# Patient Record
Sex: MALE | Race: WHITE | NOT HISPANIC OR LATINO | URBAN - METROPOLITAN AREA
[De-identification: names, ages, dates, MRNs, and addresses within clinical notes are randomized per-mention and may not be internally consistent; named-entity substitution may affect disease eponyms.]

---

## 2020-12-24 ENCOUNTER — INPATIENT (INPATIENT)
Facility: HOSPITAL | Age: 75
LOS: 1 days | Discharge: HOME | DRG: 345 | End: 2020-12-26
Attending: SURGERY | Admitting: SURGERY
Payer: MEDICARE

## 2020-12-24 VITALS
HEART RATE: 52 BPM | TEMPERATURE: 95 F | OXYGEN SATURATION: 100 % | DIASTOLIC BLOOD PRESSURE: 61 MMHG | RESPIRATION RATE: 22 BRPM | SYSTOLIC BLOOD PRESSURE: 155 MMHG

## 2020-12-24 DIAGNOSIS — K45.8 OTHER SPECIFIED ABDOMINAL HERNIA WITHOUT OBSTRUCTION OR GANGRENE: ICD-10-CM

## 2020-12-24 LAB
ABO RH CONFIRMATION: SIGNIFICANT CHANGE UP
ALBUMIN SERPL ELPH-MCNC: 2.8 G/DL — LOW (ref 3.3–5)
ALP SERPL-CCNC: 144 U/L — HIGH (ref 40–120)
ALT FLD-CCNC: 13 U/L — SIGNIFICANT CHANGE UP (ref 12–78)
ANION GAP SERPL CALC-SCNC: 8 MMOL/L — SIGNIFICANT CHANGE UP (ref 5–17)
ANISOCYTOSIS BLD QL: SLIGHT — SIGNIFICANT CHANGE UP
APPEARANCE UR: CLEAR — SIGNIFICANT CHANGE UP
APTT BLD: 30.2 SEC — SIGNIFICANT CHANGE UP (ref 27.5–35.5)
AST SERPL-CCNC: 18 U/L — SIGNIFICANT CHANGE UP (ref 15–37)
BASOPHILS # BLD AUTO: 0 K/UL — SIGNIFICANT CHANGE UP (ref 0–0.2)
BASOPHILS NFR BLD AUTO: 0 % — SIGNIFICANT CHANGE UP (ref 0–2)
BILIRUB SERPL-MCNC: 0.3 MG/DL — SIGNIFICANT CHANGE UP (ref 0.2–1.2)
BILIRUB UR-MCNC: NEGATIVE — SIGNIFICANT CHANGE UP
BLD GP AB SCN SERPL QL: SIGNIFICANT CHANGE UP
BUN SERPL-MCNC: 31 MG/DL — HIGH (ref 7–23)
CALCIUM SERPL-MCNC: 8.8 MG/DL — SIGNIFICANT CHANGE UP (ref 8.5–10.1)
CHLORIDE SERPL-SCNC: 105 MMOL/L — SIGNIFICANT CHANGE UP (ref 96–108)
CO2 SERPL-SCNC: 24 MMOL/L — SIGNIFICANT CHANGE UP (ref 22–31)
COLOR SPEC: YELLOW — SIGNIFICANT CHANGE UP
CREAT SERPL-MCNC: 1.09 MG/DL — SIGNIFICANT CHANGE UP (ref 0.5–1.3)
DIFF PNL FLD: NEGATIVE — SIGNIFICANT CHANGE UP
EOSINOPHIL # BLD AUTO: 0 K/UL — SIGNIFICANT CHANGE UP (ref 0–0.5)
EOSINOPHIL NFR BLD AUTO: 0 % — SIGNIFICANT CHANGE UP (ref 0–6)
GLUCOSE SERPL-MCNC: 248 MG/DL — HIGH (ref 70–99)
GLUCOSE UR QL: 100 MG/DL
HCT VFR BLD CALC: 33.3 % — LOW (ref 39–50)
HGB BLD-MCNC: 10.5 G/DL — LOW (ref 13–17)
INR BLD: 0.94 RATIO — SIGNIFICANT CHANGE UP (ref 0.88–1.16)
KETONES UR-MCNC: ABNORMAL
LACTATE SERPL-SCNC: 2 MMOL/L — SIGNIFICANT CHANGE UP (ref 0.7–2)
LEUKOCYTE ESTERASE UR-ACNC: NEGATIVE — SIGNIFICANT CHANGE UP
LIDOCAIN IGE QN: 123 U/L — SIGNIFICANT CHANGE UP (ref 73–393)
LYMPHOCYTES # BLD AUTO: 0.49 K/UL — LOW (ref 1–3.3)
LYMPHOCYTES # BLD AUTO: 8 % — LOW (ref 13–44)
MACROCYTES BLD QL: SLIGHT — SIGNIFICANT CHANGE UP
MANUAL SMEAR VERIFICATION: SIGNIFICANT CHANGE UP
MCHC RBC-ENTMCNC: 30.5 PG — SIGNIFICANT CHANGE UP (ref 27–34)
MCHC RBC-ENTMCNC: 31.5 GM/DL — LOW (ref 32–36)
MCV RBC AUTO: 96.8 FL — SIGNIFICANT CHANGE UP (ref 80–100)
MONOCYTES # BLD AUTO: 0.24 K/UL — SIGNIFICANT CHANGE UP (ref 0–0.9)
MONOCYTES NFR BLD AUTO: 4 % — SIGNIFICANT CHANGE UP (ref 2–14)
NEUTROPHILS # BLD AUTO: 5.36 K/UL — SIGNIFICANT CHANGE UP (ref 1.8–7.4)
NEUTROPHILS NFR BLD AUTO: 87 % — HIGH (ref 43–77)
NEUTS BAND # BLD: 1 % — SIGNIFICANT CHANGE UP (ref 0–8)
NITRITE UR-MCNC: NEGATIVE — SIGNIFICANT CHANGE UP
NRBC # BLD: 0 /100 — SIGNIFICANT CHANGE UP (ref 0–0)
NRBC # BLD: SIGNIFICANT CHANGE UP /100 WBCS (ref 0–0)
NT-PROBNP SERPL-SCNC: 222 PG/ML — HIGH (ref 0–125)
OVALOCYTES BLD QL SMEAR: SLIGHT — SIGNIFICANT CHANGE UP
PH UR: 5 — SIGNIFICANT CHANGE UP (ref 5–8)
PLAT MORPH BLD: NORMAL — SIGNIFICANT CHANGE UP
PLATELET # BLD AUTO: 208 K/UL — SIGNIFICANT CHANGE UP (ref 150–400)
POIKILOCYTOSIS BLD QL AUTO: SLIGHT — SIGNIFICANT CHANGE UP
POLYCHROMASIA BLD QL SMEAR: SLIGHT — SIGNIFICANT CHANGE UP
POTASSIUM SERPL-MCNC: 3.6 MMOL/L — SIGNIFICANT CHANGE UP (ref 3.5–5.3)
POTASSIUM SERPL-SCNC: 3.6 MMOL/L — SIGNIFICANT CHANGE UP (ref 3.5–5.3)
PROT SERPL-MCNC: 6.3 GM/DL — SIGNIFICANT CHANGE UP (ref 6–8.3)
PROT UR-MCNC: NEGATIVE MG/DL — SIGNIFICANT CHANGE UP
PROTHROM AB SERPL-ACNC: 10.9 SEC — SIGNIFICANT CHANGE UP (ref 10.6–13.6)
RBC # BLD: 3.44 M/UL — LOW (ref 4.2–5.8)
RBC # FLD: 13.8 % — SIGNIFICANT CHANGE UP (ref 10.3–14.5)
RBC BLD AUTO: ABNORMAL
SARS-COV-2 IGG SERPL QL IA: NEGATIVE — SIGNIFICANT CHANGE UP
SARS-COV-2 IGM SERPL IA-ACNC: 0.06 INDEX — SIGNIFICANT CHANGE UP
SARS-COV-2 RNA SPEC QL NAA+PROBE: SIGNIFICANT CHANGE UP
SODIUM SERPL-SCNC: 137 MMOL/L — SIGNIFICANT CHANGE UP (ref 135–145)
SP GR SPEC: 1.02 — SIGNIFICANT CHANGE UP (ref 1.01–1.02)
TROPONIN I SERPL-MCNC: <0.015 NG/ML — SIGNIFICANT CHANGE UP (ref 0.01–0.04)
UROBILINOGEN FLD QL: NEGATIVE MG/DL — SIGNIFICANT CHANGE UP
WBC # BLD: 6.09 K/UL — SIGNIFICANT CHANGE UP (ref 3.8–10.5)
WBC # FLD AUTO: 6.09 K/UL — SIGNIFICANT CHANGE UP (ref 3.8–10.5)

## 2020-12-24 PROCEDURE — 97116 GAIT TRAINING THERAPY: CPT | Mod: GP

## 2020-12-24 PROCEDURE — 85027 COMPLETE CBC AUTOMATED: CPT

## 2020-12-24 PROCEDURE — 71045 X-RAY EXAM CHEST 1 VIEW: CPT | Mod: 26,76

## 2020-12-24 PROCEDURE — 80048 BASIC METABOLIC PNL TOTAL CA: CPT

## 2020-12-24 PROCEDURE — 36415 COLL VENOUS BLD VENIPUNCTURE: CPT

## 2020-12-24 PROCEDURE — 97530 THERAPEUTIC ACTIVITIES: CPT | Mod: GP

## 2020-12-24 PROCEDURE — 74176 CT ABD & PELVIS W/O CONTRAST: CPT | Mod: 26,59

## 2020-12-24 PROCEDURE — 93010 ELECTROCARDIOGRAM REPORT: CPT

## 2020-12-24 PROCEDURE — 83735 ASSAY OF MAGNESIUM: CPT

## 2020-12-24 PROCEDURE — 84100 ASSAY OF PHOSPHORUS: CPT

## 2020-12-24 PROCEDURE — 74174 CTA ABD&PLVS W/CONTRAST: CPT | Mod: 26

## 2020-12-24 PROCEDURE — 71045 X-RAY EXAM CHEST 1 VIEW: CPT | Mod: 26,77

## 2020-12-24 PROCEDURE — 86803 HEPATITIS C AB TEST: CPT

## 2020-12-24 PROCEDURE — 97162 PT EVAL MOD COMPLEX 30 MIN: CPT | Mod: GP

## 2020-12-24 PROCEDURE — 99222 1ST HOSP IP/OBS MODERATE 55: CPT

## 2020-12-24 PROCEDURE — 74174 CTA ABD&PLVS W/CONTRAST: CPT

## 2020-12-24 PROCEDURE — 71045 X-RAY EXAM CHEST 1 VIEW: CPT

## 2020-12-24 RX ORDER — ACETAMINOPHEN 500 MG
1000 TABLET ORAL ONCE
Refills: 0 | Status: COMPLETED | OUTPATIENT
Start: 2020-12-24 | End: 2020-12-24

## 2020-12-24 RX ORDER — KETOROLAC TROMETHAMINE 30 MG/ML
15 SYRINGE (ML) INJECTION EVERY 8 HOURS
Refills: 0 | Status: DISCONTINUED | OUTPATIENT
Start: 2020-12-24 | End: 2020-12-26

## 2020-12-24 RX ORDER — SODIUM CHLORIDE 9 MG/ML
1000 INJECTION, SOLUTION INTRAVENOUS
Refills: 0 | Status: DISCONTINUED | OUTPATIENT
Start: 2020-12-24 | End: 2020-12-24

## 2020-12-24 RX ORDER — ONDANSETRON 8 MG/1
4 TABLET, FILM COATED ORAL ONCE
Refills: 0 | Status: COMPLETED | OUTPATIENT
Start: 2020-12-24 | End: 2020-12-24

## 2020-12-24 RX ORDER — SODIUM CHLORIDE 9 MG/ML
1000 INJECTION INTRAMUSCULAR; INTRAVENOUS; SUBCUTANEOUS
Refills: 0 | Status: DISCONTINUED | OUTPATIENT
Start: 2020-12-24 | End: 2020-12-26

## 2020-12-24 RX ORDER — PIPERACILLIN AND TAZOBACTAM 4; .5 G/20ML; G/20ML
3.38 INJECTION, POWDER, LYOPHILIZED, FOR SOLUTION INTRAVENOUS ONCE
Refills: 0 | Status: COMPLETED | OUTPATIENT
Start: 2020-12-24 | End: 2020-12-24

## 2020-12-24 RX ORDER — MORPHINE SULFATE 50 MG/1
4 CAPSULE, EXTENDED RELEASE ORAL ONCE
Refills: 0 | Status: DISCONTINUED | OUTPATIENT
Start: 2020-12-24 | End: 2020-12-24

## 2020-12-24 RX ORDER — MORPHINE SULFATE 50 MG/1
1 CAPSULE, EXTENDED RELEASE ORAL EVERY 4 HOURS
Refills: 0 | Status: DISCONTINUED | OUTPATIENT
Start: 2020-12-24 | End: 2020-12-26

## 2020-12-24 RX ORDER — SODIUM CHLORIDE 9 MG/ML
500 INJECTION INTRAMUSCULAR; INTRAVENOUS; SUBCUTANEOUS ONCE
Refills: 0 | Status: COMPLETED | OUTPATIENT
Start: 2020-12-24 | End: 2020-12-24

## 2020-12-24 RX ORDER — ONDANSETRON 8 MG/1
4 TABLET, FILM COATED ORAL ONCE
Refills: 0 | Status: DISCONTINUED | OUTPATIENT
Start: 2020-12-24 | End: 2020-12-24

## 2020-12-24 RX ORDER — HYDROMORPHONE HYDROCHLORIDE 2 MG/ML
0.5 INJECTION INTRAMUSCULAR; INTRAVENOUS; SUBCUTANEOUS
Refills: 0 | Status: DISCONTINUED | OUTPATIENT
Start: 2020-12-24 | End: 2020-12-24

## 2020-12-24 RX ORDER — SODIUM CHLORIDE 9 MG/ML
1000 INJECTION INTRAMUSCULAR; INTRAVENOUS; SUBCUTANEOUS
Refills: 0 | Status: DISCONTINUED | OUTPATIENT
Start: 2020-12-24 | End: 2020-12-24

## 2020-12-24 RX ORDER — FENTANYL CITRATE 50 UG/ML
50 INJECTION INTRAVENOUS
Refills: 0 | Status: DISCONTINUED | OUTPATIENT
Start: 2020-12-24 | End: 2020-12-24

## 2020-12-24 RX ORDER — ACETAMINOPHEN 500 MG
1000 TABLET ORAL EVERY 6 HOURS
Refills: 0 | Status: COMPLETED | OUTPATIENT
Start: 2020-12-24 | End: 2020-12-25

## 2020-12-24 RX ORDER — PIPERACILLIN AND TAZOBACTAM 4; .5 G/20ML; G/20ML
3.38 INJECTION, POWDER, LYOPHILIZED, FOR SOLUTION INTRAVENOUS EVERY 8 HOURS
Refills: 0 | Status: DISCONTINUED | OUTPATIENT
Start: 2020-12-24 | End: 2020-12-26

## 2020-12-24 RX ORDER — MEPERIDINE HYDROCHLORIDE 50 MG/ML
12.5 INJECTION INTRAMUSCULAR; INTRAVENOUS; SUBCUTANEOUS
Refills: 0 | Status: DISCONTINUED | OUTPATIENT
Start: 2020-12-24 | End: 2020-12-24

## 2020-12-24 RX ORDER — HEPARIN SODIUM 5000 [USP'U]/ML
5000 INJECTION INTRAVENOUS; SUBCUTANEOUS EVERY 8 HOURS
Refills: 0 | Status: DISCONTINUED | OUTPATIENT
Start: 2020-12-24 | End: 2020-12-26

## 2020-12-24 RX ORDER — ENZALUTAMIDE 80 MG/1
160 TABLET ORAL DAILY
Refills: 0 | Status: DISCONTINUED | OUTPATIENT
Start: 2020-12-24 | End: 2020-12-26

## 2020-12-24 RX ADMIN — PIPERACILLIN AND TAZOBACTAM 200 GRAM(S): 4; .5 INJECTION, POWDER, LYOPHILIZED, FOR SOLUTION INTRAVENOUS at 19:50

## 2020-12-24 RX ADMIN — SODIUM CHLORIDE 125 MILLILITER(S): 9 INJECTION INTRAMUSCULAR; INTRAVENOUS; SUBCUTANEOUS at 16:55

## 2020-12-24 RX ADMIN — ONDANSETRON 4 MILLIGRAM(S): 8 TABLET, FILM COATED ORAL at 09:25

## 2020-12-24 RX ADMIN — SODIUM CHLORIDE 1000 MILLILITER(S): 9 INJECTION INTRAMUSCULAR; INTRAVENOUS; SUBCUTANEOUS at 16:56

## 2020-12-24 RX ADMIN — SODIUM CHLORIDE 125 MILLILITER(S): 9 INJECTION INTRAMUSCULAR; INTRAVENOUS; SUBCUTANEOUS at 21:23

## 2020-12-24 RX ADMIN — Medication 400 MILLIGRAM(S): at 16:55

## 2020-12-24 RX ADMIN — Medication 1000 MILLIGRAM(S): at 17:17

## 2020-12-24 RX ADMIN — HEPARIN SODIUM 5000 UNIT(S): 5000 INJECTION INTRAVENOUS; SUBCUTANEOUS at 21:16

## 2020-12-24 RX ADMIN — MORPHINE SULFATE 4 MILLIGRAM(S): 50 CAPSULE, EXTENDED RELEASE ORAL at 09:25

## 2020-12-24 RX ADMIN — MORPHINE SULFATE 4 MILLIGRAM(S): 50 CAPSULE, EXTENDED RELEASE ORAL at 09:40

## 2020-12-24 NOTE — H&P ADULT - NSHPPHYSICALEXAM_GEN_ALL_CORE
Vital Signs Last 24 Hrs  T(C): 36.4 (24 Dec 2020 11:48), Max: 36.4 (24 Dec 2020 09:50)  T(F): 97.5 (24 Dec 2020 11:48), Max: 97.5 (24 Dec 2020 09:50)  HR: 57 (24 Dec 2020 12:50) (52 - 57)  BP: 110/43 (24 Dec 2020 12:50) (110/43 - 155/61)  BP(mean): 83 (24 Dec 2020 11:48) (83 - 83)  RR: 13 (24 Dec 2020 11:48) (13 - 22)  SpO2: 100% (24 Dec 2020 11:48) (100% - 100%)    PHYSICAL EXAM:  Constitutional: NAD, GCS: 15/15  AOX3  Eyes:  WNL  ENMT:  WNL  Neck:  WNL, non tender  Back: Non tender  Respiratory: CTABL  Cardiovascular:  S1+S2+0  Gastrointestinal: Soft, ND, mild Rt abdominal discomfort, abdoem soft otherwise  Genitourinary:  WNL  Extremities: NV intact  Vascular:  Intact  Neurological: No focal neurological deficit,  CN, motor and sensory system grossly intact.  Skin: WNL  Musculoskeletal: WNL  Psychiatric: Grossly WNL

## 2020-12-24 NOTE — CONSULT NOTE ADULT - ATTENDING COMMENTS
Patient seen and examined  CT reviewed.  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

## 2020-12-24 NOTE — ED PROVIDER NOTE - PROGRESS NOTE DETAILS
Kim Palacios for attending Dr. Craft: CT results noted confirm internal hernia with pneumatosis. Discussed with Dr. Santana, pt likely to go to OR. Pt still completely pain free, soft, nontender on exam. Admitted to surgery for further management

## 2020-12-24 NOTE — ED PROVIDER NOTE - OBJECTIVE STATEMENT
75 y DM, HTN, prostate CA presenting with R sided low back pain.  Denies f/c/v/d.  Started this morning.  Became more nauseated and sweaty thus called ems.  No prior history of similar.  Denies CP/SOB.  Denies urinary symptoms.

## 2020-12-24 NOTE — CONSULT NOTE ADULT - SUBJECTIVE AND OBJECTIVE BOX
Patient is a 75y old  Male who presents with a chief complaint of   HPI:  75 y DM, HTN, prostate CA on chemo, known metastatic disease ,presenting with R sided low back pain.  Denies f/c/v/d.  Started this morning.  Became more nauseated and sweaty thus called ems.  No prior history of similar.  Denies CP/SOB.  Denies urinary symptoms. In ED upon my exam, he has no abdominal pain, no nausea, no vomiting He had a BM today, passed gas., no diarrhea, may had 1 or 2 loose BMs off an on. No fever. No recent illness, last CT abdomen 3 weeks ago was WNL. Colonoscopy 3 years ago was WNL. No sick contacts.     ROS:.  [X] A ten-point review of systems was otherwise negative except as noted.  Systemic:	[ ] Fever	[ ] Chills	[ ] Night sweats    [ ] Fatigue	[ ] Other  [] Cardiovascular:  [] Pulmonary:  [] Renal/Urologic:  [] Gastrointestinal: abdominal pain, vomiting  [] Metabolic:  [] Neurologic:  [] Hematologic:  [] ENT:  [] Ophthalmologic:  [] Musculoskeletal:    [ ] Due to altered mental status/intubation, subjective information were not able to be obtained from the patient. History was obtained, to the extent possible, from review of the chart and collateral sources of information.    PAST MEDICAL & SURGICAL HISTORY:    FAMILY HISTORY:    Social History:    Alcohol: Denied  Smoking: Denied  Drug Use: Denied        Allergies    No Known Allergies    Intolerances      MEDICATIONS  (STANDING):    Vital Signs Last 24 Hrs  T(C): 36.4 (24 Dec 2020 11:48), Max: 36.4 (24 Dec 2020 09:50)  T(F): 97.5 (24 Dec 2020 11:48), Max: 97.5 (24 Dec 2020 09:50)  HR: 55 (24 Dec 2020 11:48) (52 - 55)  BP: 121/68 (24 Dec 2020 11:48) (121/68 - 155/61)  BP(mean): 83 (24 Dec 2020 11:48) (83 - 83)  RR: 13 (24 Dec 2020 11:48) (13 - 22)  SpO2: 100% (24 Dec 2020 11:48) (100% - 100%)  PHYSICAL EXAM:  Constitutional: NAD, GCS: 15/15  AOX3  Eyes:  WNL  ENMT:  WNL  Neck:  WNL, non tender  Back: Non tender  Respiratory: CTABL  Cardiovascular:  S1+S2+0  Gastrointestinal: Soft, ND , NT, no tenderness, guarding at all.  Genitourinary:  WNL  Extremities: NV intact  Vascular:  Intact  Neurological: No focal neurological deficit,  CN, motor and sensory system grossly intact.  Skin: WNL  Musculoskeletal: WNL  Psychiatric: Grossly WNL      Labs:                          10.5   6.09  )-----------( 208      ( 24 Dec 2020 09:02 )             33.3       12    137  |  105  |  31<H>  ----------------------------<  248<H>  3.6   |  24  |  1.09    Ca    8.8      24 Dec 2020 09:02    TPro  6.3  /  Alb  2.8<L>  /  TBili  0.3  /  DBili  x   /  AST  18  /  ALT  13  /  AlkPhos  144<H>  12-24  Lactate, Blood: 2.0 mmol/L (12.24.20 @ 09:02)    < from: CT Abdomen and Pelvis No Cont (12.24.20 @ 09:36) >  LIVER: Within normal limits.  BILE DUCTS: Normal caliber.  GALLBLADDER: Within normal limits.  SPLEEN: Within normal limits.  PANCREAS: Within normal limits.  ADRENALS: Within normal limits.  KIDNEYS/URETERS: There is a 1.3 cm rounded nodule between the LEFT kidney and the aorta which seems to connect to a branch of the renal artery suggesting a LEFT renal artery aneurysm. Further evaluation with abdominal CTA is recommended.      BLADDER: Within normal limits.  REPRODUCTIVE ORGANS: Prostate fiducial markers are noted.    BOWEL: There is swirling of the mesentery with the SMV coiling around the SMA with narrowing of the SMV best seen on image 2-44. There is stranding of the mesenteric fat with multiple enlarged mesenteric lymph nodes as well as dilated venous collaterals. There is thickening of several loops of small bowel suggesting possible bowel ischemia.  PERITONEUM: Stranding of the mesenteric fat. Small amount of ascites in the pelvis.  VESSELS: Aneurysmal dilatation of the infrarenal abdominal aorta with maximal diameter measuring 3.0 x 2.9 cm without evidence of focal dissection.  RETROPERITONEUM/LYMPH NODES: No lymphadenopathy.  ABDOMINAL WALL: Fat-containing periumbilical hernia.  BONES: There is diffuse widespread metastatic blastic disease to the visualized skeleton.    IMPRESSION:  1.  Swirling of the mesenterywith dilated loops of small bowel as well as thickened loops of small bowel. The findings suggest internal hernia/volvulus with possible bowel ischemia. CT angiography of the abdomen and pelvis (mesenteric ischemia protocol) along with surgical consultation is recommended.  2.  Rounded soft tissue nodule between the aorta and LEFT kidney likely represents renal artery aneurysm. This can be assessed at the time of CTA.  3.  Aneurysmal dilatation of the infrarenal abdominal aorta with area of focaldissection.  4.  Diffuse metastatic disease to the visualized skeleton secondary to the patient's known prostate cancer.    Findings were discussed with Dr. CAMDEN JAIME [phone] 2020 10:09 AM by Dr. Camden Gallagher with read back confirmation.        < end of copied text >      PT/INR - ( 24 Dec 2020 09:02 )   PT: 10.9 sec;   INR: 0.94 ratio         PTT - ( 24 Dec 2020 09:02 )  PTT:30.2 sec  Urinalysis Basic - ( 24 Dec 2020 09:02 )    Color: Yellow / Appearance: Clear / S.025 / pH: x  Gluc: x / Ketone: Moderate  / Bili: Negative / Urobili: Negative mg/dL   Blood: x / Protein: Negative mg/dL / Nitrite: Negative   Leuk Esterase: Negative / RBC: x / WBC x   Sq Epi: x / Non Sq Epi: x / Bacteria: x      Radiology Results:        
75 y DM, HTN, prostate CA on chemo, known metastatic disease ,presenting with R sided low back pain.  Denies f/c/v/d.  Started this morning.  Became more nauseated and sweaty thus called ems.  No prior history of similar. Denies CP/SOB. Denies urinary symptoms. In ED upon my exam, he has no abdominal pain, no nausea, no vomiting He had a BM today, passed gas., no diarrhea, may had 1 or 2 loose BMs off an on. No fever. No recent illness, last CT abdomen 3 weeks ago was WNL. Colonoscopy 3 years ago was WNL. No sick contacts.     PAST MEDICAL & SURGICAL HISTORY:  Prostate CA    No significant past surgical history    Allergies    No Known Allergies    Intolerances    Social History:  Alcohol: Denied  Smoking: Denied  Drug Use: Denied (24 Dec 2020 13:59)      Vital Signs Last 24 Hrs  T(C): 36.8 (24 Dec 2020 14:16), Max: 36.8 (24 Dec 2020 14:16)  T(F): 98.2 (24 Dec 2020 14:16), Max: 98.2 (24 Dec 2020 14:16)  HR: 58 (24 Dec 2020 14:16) (52 - 58)  BP: 118/48 (24 Dec 2020 14:16) (110/43 - 155/61)  BP(mean): 69 (24 Dec 2020 14:16) (69 - 83)  RR: 14 (24 Dec 2020 14:16) (13 - 22)  SpO2: 100% (24 Dec 2020 14:16) (100% - 100%)    PHYSICAL EXAM:  AOX3  Eyes:  WNL  ENMT:  WNL  Neck:  WNL, non tender  Back: Non tender  Respiratory: CTABL  Cardiovascular:  S1+S2+0  Gastrointestinal: Soft, ND, mild Rt abdominal discomfort, abdoem soft otherwise  Genitourinary:  WNL  Extremities: NV intact  Vascular:  Intact  Neurological: No focal neurological deficit,  CN, motor and sensory system grossly intact.  Skin: WNL  Musculoskeletal: WNL  Psychiatric: Grossly WNL    LABS:                        10.5   6.09  )-----------( 208      ( 24 Dec 2020 09:02 )             33.3     24 Dec 2020 09:02    137    |  105    |  31     ----------------------------<  248    3.6     |  24     |  1.09     Ca    8.8        24 Dec 2020 09:02    TPro  6.3    /  Alb  2.8    /  TBili  0.3    /  DBili  x      /  AST  18     /  ALT  13     /  AlkPhos  144    24 Dec 2020 09:02    PT/INR - ( 24 Dec 2020 09:02 )   PT: 10.9 sec;   INR: 0.94 ratio         PTT - ( 24 Dec 2020 09:02 )  PTT:30.2 sec    ct< from: CT Abdomen and Pelvis No Cont (12.24.20 @ 09:36) >  IMPRESSION:  1.  Swirling of the mesenterywith dilated loops of small bowel as well as thickened loops of small bowel. The findings suggest internal hernia/volvulus with possible bowel ischemia. CT angiography of the abdomen and pelvis (mesenteric ischemia protocol) along with surgical consultation is recommended.  2.  Rounded soft tissue nodule between the aorta and LEFT kidney likely represents renal artery aneurysm. This can be assessed at the time of CTA.  3.  Aneurysmal dilatation of the infrarenal abdominal aorta with area of focaldissection.  4.  Diffuse metastatic disease to the visualized skeleton secondary to the patient's known prostate cancer.    Findings were discussed with Dr. CAMDEN JAIME [phone] 12/24/2020 10:09 AM by Dr. Camden Gallagher with read back confirmation.    < end of copied text >  < from: CT Angio Abdomen and Pelvis w/ IV Cont (12.24.20 @ 11:33) >  IMPRESSION:  1.  Findings consistent with small bowel obstruction with internal hernia/volvulus. There is focal pneumatosis of the small bowel suggesting ischemia. Surgical consult is recommended.  2.  LEFT renal artery aneurysm.  3.  Infrarenal abdominal aortic aneurysm with focal dissection.  4.  RIGHT common iliac artery aneurysm.  5.  Diffuse metastatic disease to the skeleton.    Findings were discussed with Dr. CAMDEN JAIME [phone] 12/24/2020 12:44 PM by Dr. Camden Gallagher with read back confirmation.    < end of copied text >

## 2020-12-24 NOTE — CONSULT NOTE ADULT - ASSESSMENT
75 y DM, HTN, prostate CA on chemo, known metastatic disease ,presenting with R sided low back pain, presents with imaging findings of internal hernia vs volvulus with pneumatosis, concerning mesenteric ischemia, pt also has accidental findings of aneurysms, aorta, renal, iliac    - no SMA stenosis at origin seen on CTA, atherosclerotic plaque visible  - once internal hernia reduced, SMA/SMV should resume their normal patency  - 24 hours after surgery, repeat CTA A/P to access for SMV thrombosis  - if thrombosed, will need to start patient on AC  - can follow up outpatient for aneurysms monitoring  - no vascular intervention indicated at this time  - thank you for the consult    Plan discussed with vascular attending, Dr Titus 75 y DM, HTN, prostate CA on chemo, known metastatic disease ,presenting with R sided low back pain, presents with imaging findings of internal hernia vs volvulus with pneumatosis, concerning mesenteric ischemia, pt also has accidental findings of aneurysms, aorta, renal, iliac    - no SMA stenosis at origin seen on CTA, atherosclerotic plaque visible  - once internal hernia reduced, SMA/SMV should resume their normal patency  - 24 hours after surgery, repeat CT A/P with portovenous phase to access for SMV thrombosis  - if thrombosed, will need to start patient on AC  - can follow up outpatient for aneurysms monitoring  - no vascular intervention indicated at this time  - thank you for the consult    Plan discussed with vascular attending, Dr Titus

## 2020-12-24 NOTE — BRIEF OPERATIVE NOTE - NSICDXBRIEFPROCEDURE_GEN_ALL_CORE_FT
PROCEDURES:  Laparoscopic reduction of internal hernia 24-Dec-2020 16:40:10  Shauna Reddy  Diagnostic laparoscopy 24-Dec-2020 16:39:55  Shauna Reddy

## 2020-12-24 NOTE — ED ADULT TRIAGE NOTE - CHIEF COMPLAINT QUOTE
Pt p/w c/o right low back pain and diaphoresis that woke him up.  hx of prostate CA.  Denies urinary s/s, chest pain, abdominal pain radiating to his back. VSS.

## 2020-12-24 NOTE — H&P ADULT - ASSESSMENT
75 Y old mlae with internal hernia, possible pneumatosis    NPO   IV hydration   DVT prophylaxis  plan for diagnostic laparoscopy, laparotomy, possible bowel resection , possible  ostomy   Pt explained the surgical procedures in both medical terminology and in lay terms that the patient understood,  exploratory laparotomy possible bowel resection, possible ostomy. Pt explained in both medical terminology and in lay terms that  the patient understood, the benefits and alternatives of surgery including non-operative management. Pt explained at length in both  medical terminology and in lay terms that the patient understood, the associated risks of surgery including but not limited to infection,  bleeding, nerve/organ injury, post operative pain, poor wound healing, scar formation both internally and externally, risk of seroma/  hematoma/abcess formation, risk of hernia development, risk of  anastomotic leak or breakdown, risk of need for further GI/IR/Surgery intervention as required.  Pt explained in both medical terminology and in lay terms that the patient understood, the HIGH associated alexus and post operative risks of cardiac/cns/respiratory insult or injury, risk of death. Pt understood all of the above. All questions were answered. Pt gave informed consent for surgery.  Case discussed Dr Jha and Nasim because of relatively benign exam, both agree with recommendation of surgical intervention.

## 2020-12-24 NOTE — H&P ADULT - NSHPLABSRESULTS_GEN_ALL_CORE
Labs:                          10.5   6.09  )-----------( 208      ( 24 Dec 2020 09:02 )             33.3       12-24    137  |  105  |  31<H>  ----------------------------<  248<H>  3.6   |  24  |  1.09    Ca    8.8      24 Dec 2020 09:02    TPro  6.3  /  Alb  2.8<L>  /  TBili  0.3  /  DBili  x   /  AST  18  /  ALT  13  /  AlkPhos  144<H>  12-24      PT/INR - ( 24 Dec 2020 09:02 )   PT: 10.9 sec;   INR: 0.94 ratio         PTT - ( 24 Dec 2020 09:02 )  PTT:30.2 sec      Lactate, Blood: 2.0 mmol/L (12.24.20 @ 09:02)  < from: CT Angio Abdomen and Pelvis w/ IV Cont (12.24.20 @ 11:33) >    BOWEL: There is markedly dilated loops of small bowel within the deep pelvis with a large diverticula filled with fecaloid material. There is wall thickening and hyperenhancement. Findings represent small bowel obstruction with probable ischemia and internal hernia/volvulus. Exact transition point is not clearly identified.  There is focal pneumatosis involving a loop of the small bowel (3-72 through 3-81). The bowel wall enhances adjacent to this region.  The main portal vein is patent. Just below the portal splenic confluence, the SMV abruptly narrows and courses behind the SMA. The SMV at this point is severely narrowed and essentially occluded (3-53 through 3-63). The SMA is also extremely narrowed at this same location (3-57). There is development of large venous collaterals.    PERITONEUM: Stranding of the mesentery. Multiple enlarged lymph nodes. Free fluid in the deep pelvis.  VESSELS: LEFT renal artery aneurysm measuring 1.3 cm.  Infrarenal abdominal aortic aneurysm with focal calcified dissection measuring 2.9 to 3 cm.  RIGHT common iliac artery aneurysm measuring 2.3 cm.  RETROPERITONEUM/LYMPH NODES: No lymphadenopathy.  ABDOMINAL WALL: Periumbilical fat-containing hernia.  BONES: Diffuse metastatic disease to the visualized osseous skeleton.    IMPRESSION:  1.  Findings consistent with small bowel obstruction with internal hernia/volvulus. There is focal pneumatosis of the small bowel suggesting ischemia. Surgical consult is recommended.  2.  LEFT renal artery aneurysm.  3.  Infrarenal abdominal aortic aneurysm with focal dissection.  4.  RIGHT common iliac artery aneurysm.  5.  Diffuse metastatic disease to the skeleton.    Findings were discussed with Dr. CAMDEN JAIME [phone] 12/24/2020 12:44 PM by Dr. Camden Gallagher with read back confirmation.    < end of copied text >

## 2020-12-24 NOTE — PATIENT PROFILE ADULT - NSPROMEDSBROUGHTTOHOSP_GEN_A_NUR
Patient LVM stating [facility] and orthopedic physician at her [employer] to begin her care.    Would like access to report and images  Can be reached at     Pretty Hoskins MS ATC     no

## 2020-12-24 NOTE — ED ADULT NURSE REASSESSMENT NOTE - NS ED NURSE REASSESS COMMENT FT1
Pt. is resting in bed- No acute or physical distress noted at this time- Pt. denies pain- Pending CTA- Will cont to monitor patient closely- Safety maintained

## 2020-12-24 NOTE — BRIEF OPERATIVE NOTE - OPERATION/FINDINGS
multiple and large small bowel diverticuli causing internal hernia, no defect, no band adhesion, no mass, reduced internal hernia lap multiple and large small bowel diverticuli causing internal hernia, no defect, no band adhesion, no mass, reduced internal hernia lap. All bowel viable. Large loop of jejunum with large multiple diverticula, the loop was twisted on it self, no perforation, that loop has edema, but viable. no defect  identified, bowel ran 2 times from TI to LT. No resection performed.

## 2020-12-24 NOTE — ED PROVIDER NOTE - CLINICAL SUMMARY MEDICAL DECISION MAKING FREE TEXT BOX
Pt with flank pain.  no reproducible tenderness.  Would consider kidney stone.  No prior history of AAA and no pulsatile mass.  Would get rapid non-con ct a/p for eval and provide pain meds.  EKG non-ischemic and no cp/sob.  Dispo pending labs and imaging.

## 2020-12-24 NOTE — H&P ADULT - HISTORY OF PRESENT ILLNESS
75 y DM, HTN, prostate CA on chemo, known metastatic disease ,presenting with R sided low back pain.  Denies f/c/v/d.  Started this morning.  Became more nauseated and sweaty thus called ems.  No prior history of similar.  Denies CP/SOB.  Denies urinary symptoms. In ED upon my exam, he has no abdominal pain, no nausea, no vomiting He had a BM today, passed gas., no diarrhea, may had 1 or 2 loose BMs off an on. No fever. No recent illness, last CT abdomen 3 weeks ago was WNL. Colonoscopy 3 years ago was WNL. No sick contacts.

## 2020-12-24 NOTE — ED PROVIDER NOTE - PHYSICAL EXAMINATION
Constitutional: NAD, well appearing  HEENT: no rhinorrhea  CVS:  RRR, no m/r/g  Resp:  CTAB  GI: soft, ntnd  MSK:  no restriction to rom, full ROM to all extremities  Neuro:  A&Ox3, moving all extremities  Skin: no rash  psych: clear thought content  Heme/lymph:  No LAD

## 2020-12-24 NOTE — BRIEF OPERATIVE NOTE - NSICDXBRIEFPREOP_GEN_ALL_CORE_FT
PRE-OP DIAGNOSIS:  Obstruction concurrent with and due to internal hernia of abdomen 24-Dec-2020 16:40:32  Shauna Reddy

## 2020-12-24 NOTE — ED ADULT NURSE NOTE - OBJECTIVE STATEMENT
Pt. to the ED C/O Right sided Back Pain with radiation to the right flank- Pt. denies CP, SOB, Hematuria and any problems urinating. Hx. of Prostate Ca. IV and Labs in place as ordered. Will cont to monitor patient closely- Safety maintained

## 2020-12-25 LAB
ANION GAP SERPL CALC-SCNC: 5 MMOL/L — SIGNIFICANT CHANGE UP (ref 5–17)
BUN SERPL-MCNC: 26 MG/DL — HIGH (ref 7–23)
CALCIUM SERPL-MCNC: 8 MG/DL — LOW (ref 8.5–10.1)
CHLORIDE SERPL-SCNC: 111 MMOL/L — HIGH (ref 96–108)
CO2 SERPL-SCNC: 26 MMOL/L — SIGNIFICANT CHANGE UP (ref 22–31)
CREAT SERPL-MCNC: 1.2 MG/DL — SIGNIFICANT CHANGE UP (ref 0.5–1.3)
GLUCOSE SERPL-MCNC: 120 MG/DL — HIGH (ref 70–99)
HCT VFR BLD CALC: 28.1 % — LOW (ref 39–50)
HCV AB S/CO SERPL IA: 0.04 S/CO — SIGNIFICANT CHANGE UP (ref 0–0.99)
HCV AB SERPL-IMP: SIGNIFICANT CHANGE UP
HGB BLD-MCNC: 8.8 G/DL — LOW (ref 13–17)
MAGNESIUM SERPL-MCNC: 2.1 MG/DL — SIGNIFICANT CHANGE UP (ref 1.6–2.6)
MCHC RBC-ENTMCNC: 31 PG — SIGNIFICANT CHANGE UP (ref 27–34)
MCHC RBC-ENTMCNC: 31.3 GM/DL — LOW (ref 32–36)
MCV RBC AUTO: 98.9 FL — SIGNIFICANT CHANGE UP (ref 80–100)
PHOSPHATE SERPL-MCNC: 3.8 MG/DL — SIGNIFICANT CHANGE UP (ref 2.5–4.5)
PLATELET # BLD AUTO: 175 K/UL — SIGNIFICANT CHANGE UP (ref 150–400)
POTASSIUM SERPL-MCNC: 3.9 MMOL/L — SIGNIFICANT CHANGE UP (ref 3.5–5.3)
POTASSIUM SERPL-SCNC: 3.9 MMOL/L — SIGNIFICANT CHANGE UP (ref 3.5–5.3)
RBC # BLD: 2.84 M/UL — LOW (ref 4.2–5.8)
RBC # FLD: 13.8 % — SIGNIFICANT CHANGE UP (ref 10.3–14.5)
SODIUM SERPL-SCNC: 142 MMOL/L — SIGNIFICANT CHANGE UP (ref 135–145)
WBC # BLD: 4.03 K/UL — SIGNIFICANT CHANGE UP (ref 3.8–10.5)
WBC # FLD AUTO: 4.03 K/UL — SIGNIFICANT CHANGE UP (ref 3.8–10.5)

## 2020-12-25 PROCEDURE — 99024 POSTOP FOLLOW-UP VISIT: CPT

## 2020-12-25 PROCEDURE — 74174 CTA ABD&PLVS W/CONTRAST: CPT | Mod: 26

## 2020-12-25 RX ADMIN — PIPERACILLIN AND TAZOBACTAM 25 GRAM(S): 4; .5 INJECTION, POWDER, LYOPHILIZED, FOR SOLUTION INTRAVENOUS at 19:55

## 2020-12-25 RX ADMIN — HEPARIN SODIUM 5000 UNIT(S): 5000 INJECTION INTRAVENOUS; SUBCUTANEOUS at 21:09

## 2020-12-25 RX ADMIN — HEPARIN SODIUM 5000 UNIT(S): 5000 INJECTION INTRAVENOUS; SUBCUTANEOUS at 13:37

## 2020-12-25 RX ADMIN — Medication 400 MILLIGRAM(S): at 00:49

## 2020-12-25 RX ADMIN — PIPERACILLIN AND TAZOBACTAM 25 GRAM(S): 4; .5 INJECTION, POWDER, LYOPHILIZED, FOR SOLUTION INTRAVENOUS at 03:23

## 2020-12-25 RX ADMIN — PIPERACILLIN AND TAZOBACTAM 25 GRAM(S): 4; .5 INJECTION, POWDER, LYOPHILIZED, FOR SOLUTION INTRAVENOUS at 11:18

## 2020-12-25 RX ADMIN — Medication 1000 MILLIGRAM(S): at 00:56

## 2020-12-25 RX ADMIN — ENZALUTAMIDE 160 MILLIGRAM(S): 80 TABLET ORAL at 05:54

## 2020-12-25 RX ADMIN — SODIUM CHLORIDE 125 MILLILITER(S): 9 INJECTION INTRAMUSCULAR; INTRAVENOUS; SUBCUTANEOUS at 05:14

## 2020-12-25 RX ADMIN — HEPARIN SODIUM 5000 UNIT(S): 5000 INJECTION INTRAVENOUS; SUBCUTANEOUS at 05:13

## 2020-12-25 NOTE — PHYSICAL THERAPY INITIAL EVALUATION ADULT - GENERAL OBSERVATIONS, REHAB EVAL
pt was found lying in bed with IV, palumbo, Nasal suction attached to wall, Pt is pleasant and willing to amb with PT

## 2020-12-25 NOTE — PHYSICAL THERAPY INITIAL EVALUATION ADULT - PERTINENT HX OF CURRENT PROBLEM, REHAB EVAL
Pt presenting with R sided low back pain. Started yesterday morning.  Became more nauseated and sweaty thus called EMS and presented to .  No prior history of similar episodes,, diagnosed as internal hernia, possible pneumatosis, s/p Lap Internal hernia reduction. POD#1

## 2020-12-25 NOTE — PROGRESS NOTE ADULT - SUBJECTIVE AND OBJECTIVE BOX
Seen and examined at bedside this am. s/p Lap Internal hernia reduction. POD#1  Doing well. Pain well controlled. Explained to patient the findings in surgery. Pt understands the need to follow up with GI for multiple diverticuli. Denies any acute evnet overnight. VSS    MEDICATIONS  (STANDING):  enzalutamide 160 milliGRAM(s) Oral daily  heparin   Injectable 5000 Unit(s) SubCutaneous every 8 hours  piperacillin/tazobactam IVPB.. 3.375 Gram(s) IV Intermittent every 8 hours  sodium chloride 0.9%. 1000 milliLiter(s) (125 mL/Hr) IV Continuous <Continuous>    MEDICATIONS  (PRN):  ketorolac   Injectable 15 milliGRAM(s) IV Push every 8 hours PRN Moderate Pain (4 - 6)  morphine  - Injectable 1 milliGRAM(s) IV Push every 4 hours PRN Severe Pain (7 - 10)    Vital Signs Last 24 Hrs  T(C): 36.6 (25 Dec 2020 00:03), Max: 36.8 (24 Dec 2020 14:16)  T(F): 97.8 (25 Dec 2020 00:03), Max: 98.2 (24 Dec 2020 14:16)  HR: 59 (25 Dec 2020 00:03) (52 - 75)  BP: 129/55 (25 Dec 2020 00:03) (105/57 - 155/61)  BP(mean): 69 (24 Dec 2020 14:16) (69 - 83)  RR: 17 (25 Dec 2020 00:03) (13 - 22)  SpO2: 100% (25 Dec 2020 00:03) (100% - 100%)    PHYSICAL EXAM:  GENERAL: NAD, lying in bed comfortably  HEAD:  Atraumatic, Normocephalic  EYES: EOMI, PERRLA, conjunctiva and sclera clear  ENT: Moist mucous membranes  NECK: Supple, No JVD  CHEST/LUNG: Clear to auscultation bilaterally; No rales, rhonchi, wheezing, or rubs. Unlabored respirations  HEART: Regular rate and rhythm; No murmurs, rubs, or gallops  ABDOMEN: Bowel sounds present; Soft, Nontender, Nondistended. No hepatomegally. small lap sites d/c/i  EXTREMITIES:  2+ Peripheral Pulses, brisk capillary refill. No clubbing, cyanosis, or edema  NERVOUS SYSTEM:  Alert & Oriented X3, speech clear. No deficits   MSK: FROM all 4 extremities, full and equal strength  SKIN: No rashes or lesions    I&O's Detail    24 Dec 2020 07:01  -  25 Dec 2020 04:39  --------------------------------------------------------  IN:    sodium chloride 0.9%: 1066 mL    Sodium Chloride 0.9% Bolus: 500 mL  Total IN: 1566 mL    OUT:    Indwelling Catheter - Urethral (mL): 440 mL  Total OUT: 440 mL    Total NET: 1126 mL      LABS:                        10.5   6.09  )-----------( 208      ( 24 Dec 2020 09:02 )             33.3     24 Dec 2020 09:02    137    |  105    |  31     ----------------------------<  248    3.6     |  24     |  1.09     Ca    8.8        24 Dec 2020 09:02    TPro  6.3    /  Alb  2.8    /  TBili  0.3    /  DBili  x      /  AST  18     /  ALT  13     /  AlkPhos  144    24 Dec 2020 09:02    PT/INR - ( 24 Dec 2020 09:02 )   PT: 10.9 sec;   INR: 0.94 ratio         PTT - ( 24 Dec 2020 09:02 )  PTT:30.2 sec

## 2020-12-26 ENCOUNTER — TRANSCRIPTION ENCOUNTER (OUTPATIENT)
Age: 75
End: 2020-12-26

## 2020-12-26 VITALS
RESPIRATION RATE: 18 BRPM | HEART RATE: 54 BPM | SYSTOLIC BLOOD PRESSURE: 134 MMHG | OXYGEN SATURATION: 100 % | TEMPERATURE: 97 F | DIASTOLIC BLOOD PRESSURE: 52 MMHG

## 2020-12-26 LAB
ANION GAP SERPL CALC-SCNC: 1 MMOL/L — LOW (ref 5–17)
BUN SERPL-MCNC: 16 MG/DL — SIGNIFICANT CHANGE UP (ref 7–23)
CALCIUM SERPL-MCNC: 8 MG/DL — LOW (ref 8.5–10.1)
CHLORIDE SERPL-SCNC: 113 MMOL/L — HIGH (ref 96–108)
CO2 SERPL-SCNC: 28 MMOL/L — SIGNIFICANT CHANGE UP (ref 22–31)
CREAT SERPL-MCNC: 1.05 MG/DL — SIGNIFICANT CHANGE UP (ref 0.5–1.3)
GLUCOSE SERPL-MCNC: 102 MG/DL — HIGH (ref 70–99)
HCT VFR BLD CALC: 26.9 % — LOW (ref 39–50)
HGB BLD-MCNC: 8.5 G/DL — LOW (ref 13–17)
MAGNESIUM SERPL-MCNC: 2.1 MG/DL — SIGNIFICANT CHANGE UP (ref 1.6–2.6)
MCHC RBC-ENTMCNC: 30.8 PG — SIGNIFICANT CHANGE UP (ref 27–34)
MCHC RBC-ENTMCNC: 31.6 GM/DL — LOW (ref 32–36)
MCV RBC AUTO: 97.5 FL — SIGNIFICANT CHANGE UP (ref 80–100)
PHOSPHATE SERPL-MCNC: 2.6 MG/DL — SIGNIFICANT CHANGE UP (ref 2.5–4.5)
PLATELET # BLD AUTO: 165 K/UL — SIGNIFICANT CHANGE UP (ref 150–400)
POTASSIUM SERPL-MCNC: 4 MMOL/L — SIGNIFICANT CHANGE UP (ref 3.5–5.3)
POTASSIUM SERPL-SCNC: 4 MMOL/L — SIGNIFICANT CHANGE UP (ref 3.5–5.3)
RBC # BLD: 2.76 M/UL — LOW (ref 4.2–5.8)
RBC # FLD: 13.7 % — SIGNIFICANT CHANGE UP (ref 10.3–14.5)
SODIUM SERPL-SCNC: 142 MMOL/L — SIGNIFICANT CHANGE UP (ref 135–145)
WBC # BLD: 2.58 K/UL — LOW (ref 3.8–10.5)
WBC # FLD AUTO: 2.58 K/UL — LOW (ref 3.8–10.5)

## 2020-12-26 PROCEDURE — 99024 POSTOP FOLLOW-UP VISIT: CPT

## 2020-12-26 RX ORDER — OXYCODONE HYDROCHLORIDE 5 MG/1
1 TABLET ORAL
Qty: 20 | Refills: 0
Start: 2020-12-26 | End: 2020-12-30

## 2020-12-26 RX ADMIN — HEPARIN SODIUM 5000 UNIT(S): 5000 INJECTION INTRAVENOUS; SUBCUTANEOUS at 05:15

## 2020-12-26 RX ADMIN — ENZALUTAMIDE 160 MILLIGRAM(S): 80 TABLET ORAL at 09:20

## 2020-12-26 RX ADMIN — PIPERACILLIN AND TAZOBACTAM 25 GRAM(S): 4; .5 INJECTION, POWDER, LYOPHILIZED, FOR SOLUTION INTRAVENOUS at 03:16

## 2020-12-26 NOTE — DISCHARGE NOTE PROVIDER - CARE PROVIDERS DIRECT ADDRESSES
[email or web address]

## 2020-12-26 NOTE — DISCHARGE NOTE PROVIDER - CARE PROVIDER_API CALL
Bel Santana  SURGERY  [address] [facility], [address] [place] [address]  Phone: [phone]  Fax: [phone]  Follow Up Time: 2 weeks

## 2020-12-26 NOTE — PROGRESS NOTE ADULT - SUBJECTIVE AND OBJECTIVE BOX
Patient seen and examined this AM at bedside. No acute events overnight and patient resting comfortably. Tolerated clear liquid diet. No abdominal surgical pain. Denies fever/chills, shortness of breath, chest pain. VS reviewed     MEDICATIONS  (STANDING):  enzalutamide 160 milliGRAM(s) Oral daily  heparin   Injectable 5000 Unit(s) SubCutaneous every 8 hours  piperacillin/tazobactam IVPB.. 3.375 Gram(s) IV Intermittent every 8 hours  sodium chloride 0.9%. 1000 milliLiter(s) (125 mL/Hr) IV Continuous <Continuous>    MEDICATIONS  (PRN):  ketorolac   Injectable 15 milliGRAM(s) IV Push every 8 hours PRN Moderate Pain (4 - 6)  morphine  - Injectable 1 milliGRAM(s) IV Push every 4 hours PRN Severe Pain (7 - 10)    Vital Signs Last 24 Hrs  T(C): 36.6 (25 Dec 2020 00:03), Max: 36.8 (24 Dec 2020 14:16)  T(F): 97.8 (25 Dec 2020 00:03), Max: 98.2 (24 Dec 2020 14:16)  HR: 59 (25 Dec 2020 00:03) (52 - 75)  BP: 129/55 (25 Dec 2020 00:03) (105/57 - 155/61)  BP(mean): 69 (24 Dec 2020 14:16) (69 - 83)  RR: 17 (25 Dec 2020 00:03) (13 - 22)  SpO2: 100% (25 Dec 2020 00:03) (100% - 100%)    PHYSICAL EXAM:  GENERAL: NAD, lying in bed comfortably  HEAD:  Atraumatic, Normocephalic  EYES: EOMI, PERRLA, conjunctiva and sclera clear  ENT: Moist mucous membranes  NECK: Supple, No JVD  CHEST/LUNG: Clear to auscultation bilaterally; No rales, rhonchi, wheezing, or rubs. Unlabored respirations  HEART: Regular rate and rhythm; No murmurs, rubs, or gallops  ABDOMEN: Bowel sounds present; Soft, Nontender, Nondistended. No hepatomegally. small lap sites d/c/i  EXTREMITIES:  2+ Peripheral Pulses, brisk capillary refill. No clubbing, cyanosis, or edema  NERVOUS SYSTEM:  Alert & Oriented X3, speech clear. No deficits   MSK: FROM all 4 extremities, full and equal strength  SKIN: No rashes or lesions    I&O's Detail    24 Dec 2020 07:01  -  25 Dec 2020 04:39  --------------------------------------------------------  IN:    sodium chloride 0.9%: 1066 mL    Sodium Chloride 0.9% Bolus: 500 mL  Total IN: 1566 mL    OUT:    Indwelling Catheter - Urethral (mL): 440 mL  Total OUT: 440 mL    Total NET: 1126 mL      LABS:                        10.5   6.09  )-----------( 208      ( 24 Dec 2020 09:02 )             33.3     24 Dec 2020 09:02    137    |  105    |  31     ----------------------------<  248    3.6     |  24     |  1.09     Ca    8.8        24 Dec 2020 09:02    TPro  6.3    /  Alb  2.8    /  TBili  0.3    /  DBili  x      /  AST  18     /  ALT  13     /  AlkPhos  144    24 Dec 2020 09:02    PT/INR - ( 24 Dec 2020 09:02 )   PT: 10.9 sec;   INR: 0.94 ratio         PTT - ( 24 Dec 2020 09:02 )  PTT:30.2 sec   Patient seen and examined this AM at bedside. No acute events overnight and patient resting comfortably. Tolerated clear liquid diet. No abdominal surgical pain. Denies fever/chills, shortness of breath, chest pain. VS reviewed     ROS: as abovementioned otherwise negative    MEDICATIONS  (STANDING):  enzalutamide 160 milliGRAM(s) Oral daily  heparin   Injectable 5000 Unit(s) SubCutaneous every 8 hours  piperacillin/tazobactam IVPB.. 3.375 Gram(s) IV Intermittent every 8 hours  sodium chloride 0.9%. 1000 milliLiter(s) (125 mL/Hr) IV Continuous <Continuous>    MEDICATIONS  (PRN):  ketorolac   Injectable 15 milliGRAM(s) IV Push every 8 hours PRN Moderate Pain (4 - 6)  morphine  - Injectable 1 milliGRAM(s) IV Push every 4 hours PRN Severe Pain (7 - 10)    Vital Signs Last 24 Hrs  T(C): 36.6 (25 Dec 2020 00:03), Max: 36.8 (24 Dec 2020 14:16)  T(F): 97.8 (25 Dec 2020 00:03), Max: 98.2 (24 Dec 2020 14:16)  HR: 59 (25 Dec 2020 00:03) (52 - 75)  BP: 129/55 (25 Dec 2020 00:03) (105/57 - 155/61)  BP(mean): 69 (24 Dec 2020 14:16) (69 - 83)  RR: 17 (25 Dec 2020 00:03) (13 - 22)  SpO2: 100% (25 Dec 2020 00:03) (100% - 100%)    PHYSICAL EXAM:  GENERAL: NAD, lying in bed comfortably  HEAD:  Atraumatic, Normocephalic  EYES: EOMI, PERRLA, conjunctiva and sclera clear  ENT: Moist mucous membranes  NECK: Supple, No JVD  CHEST/LUNG: Clear to auscultation bilaterally; No rales, rhonchi, wheezing, or rubs. Unlabored respirations  HEART: Regular rate and rhythm; No murmurs, rubs, or gallops  ABDOMEN: Bowel sounds present; Soft, Nontender, Nondistended. No hepatomegally. small lap sites d/c/i  EXTREMITIES:  2+ Peripheral Pulses, brisk capillary refill. No clubbing, cyanosis, or edema  NERVOUS SYSTEM:  Alert & Oriented X3, speech clear. No deficits   MSK: FROM all 4 extremities, full and equal strength  SKIN: No rashes or lesions    I&O's Detail    24 Dec 2020 07:01  -  25 Dec 2020 04:39  --------------------------------------------------------  IN:    sodium chloride 0.9%: 1066 mL    Sodium Chloride 0.9% Bolus: 500 mL  Total IN: 1566 mL    OUT:    Indwelling Catheter - Urethral (mL): 440 mL  Total OUT: 440 mL    Total NET: 1126 mL      LABS:                        10.5   6.09  )-----------( 208      ( 24 Dec 2020 09:02 )             33.3     24 Dec 2020 09:02    137    |  105    |  31     ----------------------------<  248    3.6     |  24     |  1.09     Ca    8.8        24 Dec 2020 09:02    TPro  6.3    /  Alb  2.8    /  TBili  0.3    /  DBili  x      /  AST  18     /  ALT  13     /  AlkPhos  144    24 Dec 2020 09:02    PT/INR - ( 24 Dec 2020 09:02 )   PT: 10.9 sec;   INR: 0.94 ratio         PTT - ( 24 Dec 2020 09:02 )  PTT:30.2 sec

## 2020-12-26 NOTE — DISCHARGE NOTE PROVIDER - NSDCACTIVITY_GEN_ALL_CORE
No heavy lifting/straining Do not drive or operate machinery/Showering allowed/Do not make important decisions/Stairs allowed/Walking - Indoors allowed/No heavy lifting/straining/Walking - Outdoors allowed

## 2020-12-26 NOTE — DISCHARGE NOTE PROVIDER - NSDCMRMEDTOKEN_GEN_ALL_CORE_FT
hydroCHLOROthiazide 12.5 mg oral tablet: 1 tab(s) orally once a day  lisinopril 5 mg oral tablet: 1 tab(s) orally once a day  Xtandi 40 mg oral capsule: 4 cap(s) orally once a day   hydroCHLOROthiazide 12.5 mg oral tablet: 1 tab(s) orally once a day  lisinopril 5 mg oral tablet: 1 tab(s) orally once a day  oxyCODONE 5 mg oral tablet: 1 tab(s) orally every 6 hours, As Needed -for moderate pain MDD:4 tabs   Xtandi 40 mg oral capsule: 4 cap(s) orally once a day

## 2020-12-26 NOTE — PROGRESS NOTE ADULT - ATTENDING COMMENTS
75 Y old mlae with internal hernia, possible pneumatosis, s/p dx lap, internal hiatal hernia reduction. POD#2    Plan:  pain control  monitor bowel function  Diet advance as tolerated  DVT prophylaxis  OOBC/Ambulation  DISPO: discharge once patient tolerates regular diet  Pt stable from surgical standpoint

## 2020-12-26 NOTE — DISCHARGE NOTE PROVIDER - HOSPITAL COURSE
74yo M presented w/ internal hernia s/p laparoscopic internal hernia reduction POD#2. Patient has been tolerating well postoperatively, passing flatus/BM and tolerating regular diet. Denies any surgical pain, fever/chills, shortness of breath, chest pain

## 2020-12-26 NOTE — DISCHARGE NOTE NURSING/CASE MANAGEMENT/SOCIAL WORK - NSDCPEEMAIL_GEN_ALL_CORE
[facility] for Tobacco Control email [email or web address]

## 2020-12-26 NOTE — DISCHARGE NOTE NURSING/CASE MANAGEMENT/SOCIAL WORK - PATIENT PORTAL LINK FT
You can access the FollowMyHealth Patient Portal offered by [facility] by registering at the following website: [email or web address]. By joining [facility]’s FollowMyHealth portal, you will also be able to view your health information using other applications (apps) compatible with our system.

## 2020-12-26 NOTE — DISCHARGE NOTE PROVIDER - NSDCFUADDINST_GEN_ALL_CORE_FT
Please seek immediate medical attention for fever>100.4, worsening abdominal pain, chest pain, shortness of breath, inability to tolerate diet, inability to pass flatus or bowels, any adverse changes to health

## 2020-12-26 NOTE — DISCHARGE NOTE NURSING/CASE MANAGEMENT/SOCIAL WORK - NSDCPEWEB_GEN_ALL_CORE
[facility] for Tobacco Control website --- [email or web address] website --- [email or web address]

## 2020-12-26 NOTE — PROGRESS NOTE ADULT - ASSESSMENT
75 Y old mlae with internal hernia, possible pneumatosis, s/p dx lap, internal hiatal hernia reduction. POD#2    Plan:  pain control  monitor bowel function  start diet, advance as tolerated  IV hydration   DVT prophylaxis  OOBC/Ambulation  DISPO: discharge once patient tolerates regular diet    Plan discussed with surgery team and attending, Dr Nur  
75 Y old mlae with internal hernia, possible pneumatosis, s/p dx lap, internal hiatal hernia reduction. POD#1    pain control  monitor bowel function  start diet, advance as tolerated  IV hydration   DVT prophylaxis  OOBC/Ambulation  Appreciate recs regarding f/u CT A/P portovenous phase to reaccess SMV thrombosis  Dispo plan discharge home over the weekend    Plan discussed with Dr Nur

## 2020-12-28 PROBLEM — C61 MALIGNANT NEOPLASM OF PROSTATE: Chronic | Status: ACTIVE | Noted: 2020-12-24

## 2020-12-29 LAB
CULTURE RESULTS: SIGNIFICANT CHANGE UP
SPECIMEN SOURCE: SIGNIFICANT CHANGE UP

## 2021-01-07 ENCOUNTER — APPOINTMENT (OUTPATIENT)
Dept: SURGERY | Facility: CLINIC | Age: 76
End: 2021-01-07
Payer: MEDICARE

## 2021-01-07 VITALS
TEMPERATURE: 97.7 F | HEIGHT: 72 IN | OXYGEN SATURATION: 100 % | DIASTOLIC BLOOD PRESSURE: 73 MMHG | HEART RATE: 62 BPM | BODY MASS INDEX: 21.94 KG/M2 | WEIGHT: 162 LBS | SYSTOLIC BLOOD PRESSURE: 165 MMHG

## 2021-01-07 DIAGNOSIS — K57.10 DIVERTICULOSIS OF SMALL INTESTINE WITHOUT PERFORATION OR ABSCESS WITHOUT BLEEDING: ICD-10-CM

## 2021-01-07 DIAGNOSIS — E11.9 TYPE 2 DIABETES MELLITUS WITHOUT COMPLICATIONS: ICD-10-CM

## 2021-01-07 DIAGNOSIS — K46.0 UNSPECIFIED ABDOMINAL HERNIA WITH OBSTRUCTION, WITHOUT GANGRENE: ICD-10-CM

## 2021-01-07 DIAGNOSIS — C79.9 SECONDARY MALIGNANT NEOPLASM OF UNSPECIFIED SITE: ICD-10-CM

## 2021-01-07 DIAGNOSIS — K63.89 OTHER SPECIFIED DISEASES OF INTESTINE: ICD-10-CM

## 2021-01-07 DIAGNOSIS — Z79.899 OTHER LONG TERM (CURRENT) DRUG THERAPY: ICD-10-CM

## 2021-01-07 DIAGNOSIS — I10 ESSENTIAL (PRIMARY) HYPERTENSION: ICD-10-CM

## 2021-01-07 DIAGNOSIS — Z98.890 OTHER SPECIFIED POSTPROCEDURAL STATES: ICD-10-CM

## 2021-01-07 DIAGNOSIS — K59.89 OTHER SPECIFIED FUNCTIONAL INTESTINAL DISORDERS: ICD-10-CM

## 2021-01-07 DIAGNOSIS — C61 MALIGNANT NEOPLASM OF PROSTATE: ICD-10-CM

## 2021-01-07 PROCEDURE — 99024 POSTOP FOLLOW-UP VISIT: CPT

## 2021-01-07 NOTE — REASON FOR VISIT
[Post Op: _________] : a [unfilled] post op visit [FreeTextEntry1] : S/P laparoscopy, reduction of internal hernia.

## 2021-01-07 NOTE — HISTORY OF PRESENT ILLNESS
[de-identified] : S/P diagnostic laparoscopy, reduction of mesenteric twist, large loops of jejunum with multiple diverticula, but viable, no inflammation. No pain, tolerating diet. regular GI function.

## 2021-01-07 NOTE — ASSESSMENT
[FreeTextEntry1] : Doing well informed diverticula can cause problem in future if develops any pain, seek attention sooner\par Vascular follow up for iliac, renal, AAA.\par Resume activity in 1 week\par Oncology follow up\par Follow up as needed.

## 2021-01-11 ENCOUNTER — APPOINTMENT (OUTPATIENT)
Dept: VASCULAR SURGERY | Facility: CLINIC | Age: 76
End: 2021-01-11
Payer: MEDICARE

## 2021-01-11 VITALS
BODY MASS INDEX: 21.94 KG/M2 | TEMPERATURE: 97.3 F | OXYGEN SATURATION: 100 % | HEIGHT: 72 IN | DIASTOLIC BLOOD PRESSURE: 69 MMHG | SYSTOLIC BLOOD PRESSURE: 167 MMHG | WEIGHT: 162 LBS | HEART RATE: 62 BPM

## 2021-01-11 DIAGNOSIS — I72.2 ANEURYSM OF RENAL ARTERY: ICD-10-CM

## 2021-01-11 DIAGNOSIS — I71.4 ABDOMINAL AORTIC ANEURYSM, W/OUT RUPTURE: ICD-10-CM

## 2021-01-11 PROCEDURE — 99213 OFFICE O/P EST LOW 20 MIN: CPT

## 2021-01-11 NOTE — PHYSICAL EXAM
[Normal Breath Sounds] : Normal breath sounds [Normal Rate and Rhythm] : normal rate and rhythm [2+] : left 2+ [1+] : left 1+ [Ankle Swelling (On Exam)] : present [Ankle Swelling Bilaterally] : bilaterally  [Ankle Swelling On The Right] : mild [No Rash or Lesion] : No rash or lesion [Alert] : alert [Oriented to Person] : oriented to person [Oriented to Place] : oriented to place [Oriented to Time] : oriented to time [JVD] : no jugular venous distention  [Varicose Veins Of Lower Extremities] : not present [] : not present [Abdomen Masses] : No abdominal masses [de-identified] : Well appearing [de-identified] : NCAT [de-identified] : Well healing midline incision, no pulsatile mass

## 2021-01-11 NOTE — HISTORY OF PRESENT ILLNESS
[FreeTextEntry1] : 75 year old male with prostate cancer and bone metastases, who presented to [facility] 2 weeks ago with SBO secondary to an internal hernia. He underwent KONSTANTIN and has since been discharged home. He presents today for evaluation of incidentally found left renal, infrarenal aortic and right common iliac aneurysms found on his preoperative CT abdomen.\par Patient is an smoker\par No family h/o aneurysmal disease

## 2021-01-11 NOTE — DATA REVIEWED
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

## 2021-08-16 ENCOUNTER — APPOINTMENT (OUTPATIENT)
Dept: VASCULAR SURGERY | Facility: CLINIC | Age: 76
End: 2021-08-16
Payer: MEDICARE

## 2021-08-16 VITALS
HEIGHT: 72 IN | SYSTOLIC BLOOD PRESSURE: 158 MMHG | BODY MASS INDEX: 21.94 KG/M2 | DIASTOLIC BLOOD PRESSURE: 73 MMHG | WEIGHT: 162 LBS | HEART RATE: 54 BPM

## 2021-08-16 DIAGNOSIS — I72.3 ANEURYSM OF ILIAC ARTERY: ICD-10-CM

## 2021-08-16 DIAGNOSIS — I73.9 PERIPHERAL VASCULAR DISEASE, UNSPECIFIED: ICD-10-CM

## 2021-08-16 PROCEDURE — 93978 VASCULAR STUDY: CPT

## 2021-08-16 PROCEDURE — 99213 OFFICE O/P EST LOW 20 MIN: CPT

## 2021-08-16 PROCEDURE — 93979 VASCULAR STUDY: CPT | Mod: 59

## 2021-08-16 NOTE — HISTORY OF PRESENT ILLNESS
[FreeTextEntry1] : 75 year old male with prostate cancer and stable bone metastases, being evaluated by me for infrarenal aortic ectasia, right common iliac aneurysm and right common femoral artery occlusion seen on CTA in 2020\par  [de-identified] : Last seen 6 months ago\par He is without complaints\par He has minimal RLE claudication and can walk > 2 miles without stopping\par Patient had recent contrast CT of his chest abdomen and pelvis [place] [facility] [place] brought the report

## 2021-08-16 NOTE — ASSESSMENT
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

## 2021-08-16 NOTE — PHYSICAL EXAM
[JVD] : no jugular venous distention  [Normal Breath Sounds] : Normal breath sounds [Normal Rate and Rhythm] : normal rate and rhythm [2+] : left 2+ [0] : right 0 [1+] : left 1+ [Ankle Swelling (On Exam)] : present [Ankle Swelling Bilaterally] : bilaterally  [Ankle Swelling On The Right] : mild [Varicose Veins Of Lower Extremities] : not present [] : not present [Abdomen Masses] : No abdominal masses [No Rash or Lesion] : No rash or lesion [Alert] : alert [Oriented to Person] : oriented to person [Oriented to Place] : oriented to place [Oriented to Time] : oriented to time [de-identified] : Well appearing [de-identified] : Well healing midline incision, no pulsatile mass [de-identified] : NCAT

## 2022-03-15 ENCOUNTER — INPATIENT (INPATIENT)
Facility: HOSPITAL | Age: 77
LOS: 4 days | Discharge: HOME | DRG: 811 | End: 2022-03-20
Attending: INTERNAL MEDICINE | Admitting: INTERNAL MEDICINE
Payer: MEDICARE

## 2022-03-15 VITALS — WEIGHT: 160.06 LBS | HEIGHT: 72 IN

## 2022-03-15 DIAGNOSIS — D64.9 ANEMIA, UNSPECIFIED: ICD-10-CM

## 2022-03-15 LAB
ALBUMIN SERPL ELPH-MCNC: 2 G/DL — LOW (ref 3.3–5)
ALP SERPL-CCNC: 189 U/L — HIGH (ref 40–120)
ALT FLD-CCNC: 10 U/L — LOW (ref 12–78)
ANION GAP SERPL CALC-SCNC: 6 MMOL/L — SIGNIFICANT CHANGE UP (ref 5–17)
APPEARANCE UR: ABNORMAL
APTT BLD: 37.9 SEC — HIGH (ref 27.5–35.5)
AST SERPL-CCNC: 12 U/L — LOW (ref 15–37)
BASOPHILS # BLD AUTO: 0.07 K/UL — SIGNIFICANT CHANGE UP (ref 0–0.2)
BASOPHILS NFR BLD AUTO: 0.3 % — SIGNIFICANT CHANGE UP (ref 0–2)
BILIRUB SERPL-MCNC: 0.5 MG/DL — SIGNIFICANT CHANGE UP (ref 0.2–1.2)
BILIRUB UR-MCNC: ABNORMAL
BUN SERPL-MCNC: 23 MG/DL — SIGNIFICANT CHANGE UP (ref 7–23)
CALCIUM SERPL-MCNC: 9.4 MG/DL — SIGNIFICANT CHANGE UP (ref 8.5–10.1)
CHLORIDE SERPL-SCNC: 105 MMOL/L — SIGNIFICANT CHANGE UP (ref 96–108)
CO2 SERPL-SCNC: 24 MMOL/L — SIGNIFICANT CHANGE UP (ref 22–31)
COLOR SPEC: YELLOW — SIGNIFICANT CHANGE UP
CREAT SERPL-MCNC: 0.93 MG/DL — SIGNIFICANT CHANGE UP (ref 0.5–1.3)
D DIMER BLD IA.RAPID-MCNC: 522 NG/ML DDU — HIGH
DIFF PNL FLD: NEGATIVE — SIGNIFICANT CHANGE UP
EGFR: 86 ML/MIN/1.73M2 — SIGNIFICANT CHANGE UP
EOSINOPHIL # BLD AUTO: 0.3 K/UL — SIGNIFICANT CHANGE UP (ref 0–0.5)
EOSINOPHIL NFR BLD AUTO: 1.4 % — SIGNIFICANT CHANGE UP (ref 0–6)
GLUCOSE SERPL-MCNC: 123 MG/DL — HIGH (ref 70–99)
GLUCOSE UR QL: NEGATIVE — SIGNIFICANT CHANGE UP
HCT VFR BLD CALC: 23.4 % — LOW (ref 39–50)
HGB BLD-MCNC: 6.7 G/DL — CRITICAL LOW (ref 13–17)
IMM GRANULOCYTES NFR BLD AUTO: 1.1 % — SIGNIFICANT CHANGE UP (ref 0–1.5)
INR BLD: 1.33 RATIO — HIGH (ref 0.88–1.16)
KETONES UR-MCNC: ABNORMAL
LACTATE SERPL-SCNC: 1.3 MMOL/L — SIGNIFICANT CHANGE UP (ref 0.7–2)
LEUKOCYTE ESTERASE UR-ACNC: ABNORMAL
LIDOCAIN IGE QN: 32 U/L — LOW (ref 73–393)
LYMPHOCYTES # BLD AUTO: 0.84 K/UL — LOW (ref 1–3.3)
LYMPHOCYTES # BLD AUTO: 3.8 % — LOW (ref 13–44)
MAGNESIUM SERPL-MCNC: 2.4 MG/DL — SIGNIFICANT CHANGE UP (ref 1.6–2.6)
MCHC RBC-ENTMCNC: 22.7 PG — LOW (ref 27–34)
MCHC RBC-ENTMCNC: 28.6 GM/DL — LOW (ref 32–36)
MCV RBC AUTO: 79.3 FL — LOW (ref 80–100)
MONOCYTES # BLD AUTO: 1.26 K/UL — HIGH (ref 0–0.9)
MONOCYTES NFR BLD AUTO: 5.8 % — SIGNIFICANT CHANGE UP (ref 2–14)
NEUTROPHILS # BLD AUTO: 19.17 K/UL — HIGH (ref 1.8–7.4)
NEUTROPHILS NFR BLD AUTO: 87.6 % — HIGH (ref 43–77)
NITRITE UR-MCNC: NEGATIVE — SIGNIFICANT CHANGE UP
NT-PROBNP SERPL-SCNC: 1217 PG/ML — HIGH (ref 0–450)
PH UR: 5 — SIGNIFICANT CHANGE UP (ref 5–8)
PLATELET # BLD AUTO: 395 K/UL — SIGNIFICANT CHANGE UP (ref 150–400)
POTASSIUM SERPL-MCNC: 4.8 MMOL/L — SIGNIFICANT CHANGE UP (ref 3.5–5.3)
POTASSIUM SERPL-SCNC: 4.8 MMOL/L — SIGNIFICANT CHANGE UP (ref 3.5–5.3)
PROT SERPL-MCNC: 7.1 GM/DL — SIGNIFICANT CHANGE UP (ref 6–8.3)
PROT UR-MCNC: 30 MG/DL
PROTHROM AB SERPL-ACNC: 15.5 SEC — HIGH (ref 10.5–13.4)
PSA FREE FLD-MCNC: 0.18 NG/ML — SIGNIFICANT CHANGE UP
PSA FREE FLD-MCNC: 8 % — SIGNIFICANT CHANGE UP
PSA SERPL-MCNC: 2.33 NG/ML — SIGNIFICANT CHANGE UP (ref 0–4)
RAPID RVP RESULT: SIGNIFICANT CHANGE UP
RBC # BLD: 2.95 M/UL — LOW (ref 4.2–5.8)
RBC # FLD: 18.6 % — HIGH (ref 10.3–14.5)
SARS-COV-2 RNA SPEC QL NAA+PROBE: SIGNIFICANT CHANGE UP
SODIUM SERPL-SCNC: 135 MMOL/L — SIGNIFICANT CHANGE UP (ref 135–145)
SP GR SPEC: 1.02 — SIGNIFICANT CHANGE UP (ref 1.01–1.02)
TROPONIN I, HIGH SENSITIVITY RESULT: 11.38 NG/L — SIGNIFICANT CHANGE UP
UROBILINOGEN FLD QL: 8
WBC # BLD: 21.89 K/UL — HIGH (ref 3.8–10.5)
WBC # FLD AUTO: 21.89 K/UL — HIGH (ref 3.8–10.5)

## 2022-03-15 PROCEDURE — 93306 TTE W/DOPPLER COMPLETE: CPT

## 2022-03-15 PROCEDURE — 73502 X-RAY EXAM HIP UNI 2-3 VIEWS: CPT | Mod: RT

## 2022-03-15 PROCEDURE — 82272 OCCULT BLD FECES 1-3 TESTS: CPT

## 2022-03-15 PROCEDURE — 83540 ASSAY OF IRON: CPT

## 2022-03-15 PROCEDURE — 93010 ELECTROCARDIOGRAM REPORT: CPT

## 2022-03-15 PROCEDURE — 99285 EMERGENCY DEPT VISIT HI MDM: CPT

## 2022-03-15 PROCEDURE — 36415 COLL VENOUS BLD VENIPUNCTURE: CPT

## 2022-03-15 PROCEDURE — 73720 MRI LWR EXTREMITY W/O&W/DYE: CPT | Mod: RT

## 2022-03-15 PROCEDURE — 93005 ELECTROCARDIOGRAM TRACING: CPT

## 2022-03-15 PROCEDURE — 74177 CT ABD & PELVIS W/CONTRAST: CPT

## 2022-03-15 PROCEDURE — 93970 EXTREMITY STUDY: CPT

## 2022-03-15 PROCEDURE — 86850 RBC ANTIBODY SCREEN: CPT

## 2022-03-15 PROCEDURE — 82728 ASSAY OF FERRITIN: CPT

## 2022-03-15 PROCEDURE — 85045 AUTOMATED RETICULOCYTE COUNT: CPT

## 2022-03-15 PROCEDURE — 86923 COMPATIBILITY TEST ELECTRIC: CPT

## 2022-03-15 PROCEDURE — A9579: CPT

## 2022-03-15 PROCEDURE — 82306 VITAMIN D 25 HYDROXY: CPT

## 2022-03-15 PROCEDURE — 84443 ASSAY THYROID STIM HORMONE: CPT

## 2022-03-15 PROCEDURE — 82746 ASSAY OF FOLIC ACID SERUM: CPT

## 2022-03-15 PROCEDURE — 99223 1ST HOSP IP/OBS HIGH 75: CPT

## 2022-03-15 PROCEDURE — 73706 CT ANGIO LWR EXTR W/O&W/DYE: CPT | Mod: 26,RT,MA

## 2022-03-15 PROCEDURE — 80053 COMPREHEN METABOLIC PANEL: CPT

## 2022-03-15 PROCEDURE — 83735 ASSAY OF MAGNESIUM: CPT

## 2022-03-15 PROCEDURE — 36430 TRANSFUSION BLD/BLD COMPNT: CPT

## 2022-03-15 PROCEDURE — 87040 BLOOD CULTURE FOR BACTERIA: CPT

## 2022-03-15 PROCEDURE — 86901 BLOOD TYPING SEROLOGIC RH(D): CPT

## 2022-03-15 PROCEDURE — 85027 COMPLETE CBC AUTOMATED: CPT

## 2022-03-15 PROCEDURE — 85014 HEMATOCRIT: CPT

## 2022-03-15 PROCEDURE — 83010 ASSAY OF HAPTOGLOBIN QUANT: CPT

## 2022-03-15 PROCEDURE — 71275 CT ANGIOGRAPHY CHEST: CPT | Mod: 26,MA

## 2022-03-15 PROCEDURE — 80048 BASIC METABOLIC PNL TOTAL CA: CPT

## 2022-03-15 PROCEDURE — 73552 X-RAY EXAM OF FEMUR 2/>: CPT | Mod: RT

## 2022-03-15 PROCEDURE — 84100 ASSAY OF PHOSPHORUS: CPT

## 2022-03-15 PROCEDURE — 86900 BLOOD TYPING SEROLOGIC ABO: CPT

## 2022-03-15 PROCEDURE — 71045 X-RAY EXAM CHEST 1 VIEW: CPT | Mod: 26

## 2022-03-15 PROCEDURE — 82607 VITAMIN B-12: CPT

## 2022-03-15 PROCEDURE — 83615 LACTATE (LD) (LDH) ENZYME: CPT

## 2022-03-15 PROCEDURE — 83605 ASSAY OF LACTIC ACID: CPT

## 2022-03-15 PROCEDURE — P9016: CPT

## 2022-03-15 PROCEDURE — 83550 IRON BINDING TEST: CPT

## 2022-03-15 PROCEDURE — 85730 THROMBOPLASTIN TIME PARTIAL: CPT

## 2022-03-15 PROCEDURE — 85610 PROTHROMBIN TIME: CPT

## 2022-03-15 PROCEDURE — 85018 HEMOGLOBIN: CPT

## 2022-03-15 PROCEDURE — 85025 COMPLETE CBC W/AUTO DIFF WBC: CPT

## 2022-03-15 PROCEDURE — 84484 ASSAY OF TROPONIN QUANT: CPT

## 2022-03-15 RX ORDER — ACETAMINOPHEN 500 MG
650 TABLET ORAL EVERY 6 HOURS
Refills: 0 | Status: DISCONTINUED | OUTPATIENT
Start: 2022-03-15 | End: 2022-03-20

## 2022-03-15 RX ORDER — SODIUM CHLORIDE 9 MG/ML
1000 INJECTION INTRAMUSCULAR; INTRAVENOUS; SUBCUTANEOUS
Refills: 0 | Status: DISCONTINUED | OUTPATIENT
Start: 2022-03-15 | End: 2022-03-19

## 2022-03-15 RX ORDER — ENOXAPARIN SODIUM 100 MG/ML
40 INJECTION SUBCUTANEOUS EVERY 24 HOURS
Refills: 0 | Status: DISCONTINUED | OUTPATIENT
Start: 2022-03-15 | End: 2022-03-20

## 2022-03-15 RX ORDER — ACETAMINOPHEN 500 MG
1 TABLET ORAL
Qty: 0 | Refills: 0 | DISCHARGE

## 2022-03-15 RX ORDER — ONDANSETRON 8 MG/1
4 TABLET, FILM COATED ORAL ONCE
Refills: 0 | Status: COMPLETED | OUTPATIENT
Start: 2022-03-15 | End: 2022-03-15

## 2022-03-15 RX ORDER — ONDANSETRON 8 MG/1
4 TABLET, FILM COATED ORAL EVERY 8 HOURS
Refills: 0 | Status: DISCONTINUED | OUTPATIENT
Start: 2022-03-15 | End: 2022-03-20

## 2022-03-15 RX ORDER — LANOLIN ALCOHOL/MO/W.PET/CERES
3 CREAM (GRAM) TOPICAL AT BEDTIME
Refills: 0 | Status: DISCONTINUED | OUTPATIENT
Start: 2022-03-15 | End: 2022-03-20

## 2022-03-15 RX ORDER — LISINOPRIL 2.5 MG/1
5 TABLET ORAL DAILY
Refills: 0 | Status: DISCONTINUED | OUTPATIENT
Start: 2022-03-15 | End: 2022-03-20

## 2022-03-15 RX ORDER — LISINOPRIL 2.5 MG/1
1 TABLET ORAL
Qty: 0 | Refills: 0 | DISCHARGE

## 2022-03-15 RX ORDER — ENZALUTAMIDE 80 MG/1
4 TABLET ORAL
Qty: 0 | Refills: 0 | DISCHARGE

## 2022-03-15 RX ORDER — ENZALUTAMIDE 80 MG/1
160 TABLET ORAL DAILY
Refills: 0 | Status: DISCONTINUED | OUTPATIENT
Start: 2022-03-15 | End: 2022-03-20

## 2022-03-15 RX ORDER — SODIUM CHLORIDE 9 MG/ML
1000 INJECTION INTRAMUSCULAR; INTRAVENOUS; SUBCUTANEOUS ONCE
Refills: 0 | Status: COMPLETED | OUTPATIENT
Start: 2022-03-15 | End: 2022-03-15

## 2022-03-15 RX ADMIN — ONDANSETRON 4 MILLIGRAM(S): 8 TABLET, FILM COATED ORAL at 15:28

## 2022-03-15 RX ADMIN — Medication 650 MILLIGRAM(S): at 21:25

## 2022-03-15 RX ADMIN — SODIUM CHLORIDE 1000 MILLILITER(S): 9 INJECTION INTRAMUSCULAR; INTRAVENOUS; SUBCUTANEOUS at 17:37

## 2022-03-15 RX ADMIN — SODIUM CHLORIDE 2000 MILLILITER(S): 9 INJECTION INTRAMUSCULAR; INTRAVENOUS; SUBCUTANEOUS at 15:28

## 2022-03-15 NOTE — H&P ADULT - NSHPPHYSICALEXAM_GEN_ALL_CORE
Vital Signs Last 24 Hrs  T(C): 36.7 (15 Mar 2022 17:37), Max: 36.8 (15 Mar 2022 14:34)  T(F): 98.1 (15 Mar 2022 17:37), Max: 98.3 (15 Mar 2022 14:34)  HR: 75 (15 Mar 2022 17:37) (73 - 75)  BP: 139/62 (15 Mar 2022 17:37) (139/62 - 143/58)  BP(mean): 81 (15 Mar 2022 14:34) (81 - 81)  RR: 17 (15 Mar 2022 17:37) (17 - 18)  SpO2: 99% (15 Mar 2022 17:37) (99% - 99%)

## 2022-03-15 NOTE — ED PROVIDER NOTE - CLINICAL SUMMARY MEDICAL DECISION MAKING FREE TEXT BOX
Pt with hx of prostate cancer with multiple complaints. Will check labs, imaging, hydrate and reassess.

## 2022-03-15 NOTE — ED PROVIDER NOTE - SKIN, MLM
Skin normal color for race, warm, dry and intact. No evidence of rash. 3cm soft mass to proximal medial right thigh without pulsation. Poor skin turgor.

## 2022-03-15 NOTE — PATIENT PROFILE ADULT - FALL HARM RISK - UNIVERSAL INTERVENTIONS
Bed in lowest position, wheels locked, appropriate side rails in place/Call bell, personal items and telephone in reach/Instruct patient to call for assistance before getting out of bed or chair/Non-slip footwear when patient is out of bed/[place] to call system/Physically safe environment - no spills, clutter or unnecessary equipment/Purposeful Proactive Rounding/Room/bathroom lighting operational, light cord in reach

## 2022-03-15 NOTE — ED PROVIDER NOTE - OBJECTIVE STATEMENT
75 y/o male with a PMHx of HTN on Lisinopril, prostate cancer on oral chemo daily presents to the ED c/o dizziness, SOB and intermittent diarrhea x3 weeks. Per wife, pt has had a 12lb weight loss since December 2021. Denies HA, fevers. No recent abx use. No other complaints at this time.

## 2022-03-15 NOTE — PATIENT PROFILE ADULT - STATED REASON FOR ADMISSION
Pt having SOB & lightheaded for a long period of time. Pt prostate CA trial that ended in december, feels symptoms came after this. Pt also has lump on R thigh.

## 2022-03-15 NOTE — H&P ADULT - HISTORY OF PRESENT ILLNESS
76 year old male patient with pertinent past medical history noted for Prostate cancer currently on chemotherapy presented to the ED complaining of a 3 month history of a progressively worsening weakness and fatigue associated shortness of breath and dizziness. Patient states he has become profoundly weak- to the point that he struggles with his activities of daily living. Of note, patient has lost approximately 12 pound in the past 3 months. States he was on a clinical trial that abruptly ended approximately 3 months ago and noticed symptoms shortly after. Also with intermittent chills, hot flashes and diarrhea. Patient also with painless lump to right anterior thigh, which has been present for about 3 months. Patient with bloody bowel movements, vomiting, chest pain or palpitations. He does endorse early satiety/ loss of appetite.        In the ED patient with WBC elevated to 21, elevated D-dimer and CTA chest negative for PE.    CTA right lower extremity noted for : Vascular mass involving predominantly the right vastus medialis muscle   and encasing the right superficial femoral artery which is focally   occluded. Recommend further evaluation with MRI

## 2022-03-15 NOTE — PHARMACOTHERAPY INTERVENTION NOTE - COMMENTS
Medication reconciliation completed.  Reviewed Medication list and confirmed med allergies with patient; confirmed with Dr. Kamara MedHx.  pt confirms that wife can bring in Xtandi from home if needed.

## 2022-03-15 NOTE — ED ADULT NURSE REASSESSMENT NOTE - NS ED NURSE REASSESS COMMENT FT1
Report received from LAMBERTO Carpenter. Pt A&Ox4, resting comfortably in stretcher. VSS. No further complaints at this time.

## 2022-03-15 NOTE — H&P ADULT - ASSESSMENT
76 year old male patient with findings consistent with symptomatic anemia          -Admit to Medsurg          # Symptomatic Anemia  -patient with negative guaiac  - to be given one unit PRBC in the ED  -Oncology to evaluate pt      # Elevated D-Dimer  -likely secondary to Prostate cancer  -CTA chest negative for PE      # Leukocytosis  -no focal signs of infection noted  -trend cbc  # Diarrhea  -doubt C.diff  -follow c diff pcr ordered by the ED    # Protein Calorie Malnutrition  -nutritionist evaluation    # Right thigh mass  -vascular mass involving predominantly the right vastus medialis muscle   and encasing the right superficial femoral artery which is focally   occluded.  -will get MRI for better clinical picture  -currently without pain/ limb weakness    #Right lower lobe lung nodule  -pt aware of nodule. States it has been present for 20 years    # Hx of prostate cancer  -on chemotherapy  -wife to bring in medication    # Debility/ Weakness  -likely secondary to above anemia  -will get routine PT evaluation    #HTN  - on Lisinopril    # DVT ppx  -give lovenox

## 2022-03-15 NOTE — ED PROVIDER NOTE - CONSTITUTIONAL, MLM
[place] appearing, awake, alert, oriented to person, place, time/situation and in no apparent distress. normal...

## 2022-03-15 NOTE — ED ADULT TRIAGE NOTE - CHIEF COMPLAINT QUOTE
pt. c/o dizziness and SOB, pt. has hx of prostate CA, currently on Chemo, has had "GI upset" for a while. main bed called. EKG DONE

## 2022-03-15 NOTE — ED ADULT NURSE NOTE - NSIMPLEMENTINTERV_GEN_ALL_ED
Implemented All Fall with Harm Risk Interventions:  [place] to call system. Call bell, personal items and telephone within reach. Instruct patient to call for assistance. Room bathroom lighting operational. Non-slip footwear when patient is off stretcher. Physically safe environment: no spills, clutter or unnecessary equipment. Stretcher in lowest position, wheels locked, appropriate side rails in place. Provide visual cue, wrist band, yellow gown, etc. Monitor gait and stability. Monitor for mental status changes and reorient to person, place, and time. Review medications for side effects contributing to fall risk. Reinforce activity limits and safety measures with patient and family. Provide visual clues: red socks.
No

## 2022-03-15 NOTE — PATIENT PROFILE ADULT - NSPROGENSOURCEINFO_GEN_A_NUR
Subjective   51-year-old male presents to emergency department with complaint of pain and swelling in his bilateral legs as well as dizziness.  He states pain in his knees after previous bilateral knee replacement is also having swelling and foot pain.  He denies any injury.  He states that he gets lightheaded when he stands up but does not describe any true room spinning.he also states abdominal discomfort that is vague in location as well as some nausea.He denies any fever, chills, chest pain or shortness of breath.    Family history, surgical history, social history, current medications and allergies are reviewed with the patient and triage documentation and vitals are reviewed.          History provided by:  Patient   used: No        Review of Systems   Constitutional: Negative for activity change, chills and fever.   HENT: Negative for congestion, sinus pain and sinus pressure.    Eyes: Negative for photophobia, pain and visual disturbance.   Respiratory: Negative for cough, chest tightness, shortness of breath and wheezing.    Cardiovascular: Positive for leg swelling. Negative for chest pain and palpitations.   Gastrointestinal: Positive for abdominal pain and nausea. Negative for abdominal distention, constipation, diarrhea and vomiting.   Endocrine: Negative.    Genitourinary: Negative for dysuria, frequency, hematuria and urgency.   Musculoskeletal: Positive for arthralgias and joint swelling. Negative for neck pain.   Skin: Negative for rash and wound.   Allergic/Immunologic: Negative.    Neurological: Positive for light-headedness. Negative for dizziness, seizures, syncope, weakness and headaches.   Hematological: Negative for adenopathy. Does not bruise/bleed easily.   Psychiatric/Behavioral: Negative.        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     Has appt to eval for EVL or other tx      • Viral hepatitis C     past treatment       Allergies   Allergen Reactions   • Aspirin Shortness Of Breath       Past Surgical History:   Procedure Laterality Date   • COLONOSCOPY  2015    WNL   • INJECTION OF MEDICATION  04/05/2016    kenalog   • INJECTION OF MEDICATION  02/17/2016    ROCEPHIN   • INJECTION OF MEDICATION  06/19/2015    TORADOL   • LIVER BIOPSY  2012    NEEDLE, HEP C   • REPLACEMENT TOTAL KNEE BILATERAL         Family History   Problem Relation Age of Onset   • Diabetes Mother    • Breast cancer Father    • Diabetes Father    • Stroke Father        Social History     Social History   • Marital status:      Social History Main Topics   • Smoking status: Former Smoker     Packs/day: 1.00     Years: 10.00     Types: Cigarettes     Quit date: 12/7/1996   • Smokeless tobacco: Never Used      Comment: states 1 pack would last 2-4 days, smoked 8-10 years   • Alcohol use No   • Drug use: Unknown      Comment: PAST: ETOH, Meth, Pot   • Sexual activity: Yes     Other Topics Concern   • Not on file           Objective   Physical Exam   Constitutional: He is oriented to person, place, and time. He appears well-developed and well-nourished. No distress.   HENT:   Head: Normocephalic.   Right Ear: External ear normal.   Left Ear: External ear normal.   Mouth/Throat: Oropharynx is clear and moist.   Eyes: Pupils are equal, round, and reactive to light. Right eye exhibits no discharge. Left eye exhibits no discharge.   Neck: Normal range of motion. Neck supple. No JVD present.   Cardiovascular:  Normal rate, regular rhythm, normal heart sounds and intact distal pulses.    No murmur heard.  Pulmonary/Chest: Effort normal and breath sounds normal. No respiratory distress. He has no wheezes.   Abdominal: Soft. Bowel sounds are normal.   Musculoskeletal:        Right knee: He exhibits swelling. He exhibits normal range of motion, no effusion, no ecchymosis, no deformity, no laceration, no erythema, normal alignment, no LCL laxity, no bony tenderness, normal meniscus and no MCL laxity.        Left knee: He exhibits swelling. He exhibits normal range of motion, no effusion, no ecchymosis, no deformity, no laceration, no erythema, normal alignment, no LCL laxity and no bony tenderness.   Neurological: He is alert and oriented to person, place, and time. He has normal strength. No cranial nerve deficit or sensory deficit. Coordination and gait normal. GCS eye subscore is 4. GCS verbal subscore is 5. GCS motor subscore is 6.   Reflex Scores:       Bicep reflexes are 2+ on the right side and 2+ on the left side.       Patellar reflexes are 2+ on the right side and 2+ on the left side.  Skin: Skin is warm and dry. Capillary refill takes less than 2 seconds. He is not diaphoretic.   Psychiatric: He has a normal mood and affect.   Nursing note and vitals reviewed.      Procedures  None         ED Course      Labs Reviewed   COMPREHENSIVE METABOLIC PANEL - Abnormal; Notable for the following:        Result Value    Glucose 102 (*)     All other components within normal limits   D-DIMER, QUANTITATIVE - Abnormal; Notable for the following:     D-Dimer, Quantitative 847 (*)     All other components within normal limits    Narrative:     Dimer values <500 ng/ml FEU are FDA approved as aid in diagnosis of deep venous thrombosis and pulmonary embolism.  This test should not be used in an exclusion strategy with pretest probability alone.    A recent guideline regarding diagnosis for pulmonary thromboembolism recommends an  adjusted exclusion criterion of age x 10 ng/ml FEU for patients >50 years of age ([employer] Intern Med 2015; 163: 701-711).   CBC WITH AUTO DIFFERENTIAL - Abnormal; Notable for the following:     Immature Grans % 1.1 (*)     Immature Grans, Absolute 0.07 (*)     All other components within normal limits   AMYLASE - Normal   LIPASE - Normal   RAINBOW DRAW    Narrative:     The following orders were created for panel order [place] Draw.  Procedure                               Abnormality         Status                     ---------                               -----------         ------                     Light Blue Top[[id number]]                                   Final result               Green Top (Gel)[[id number]]                                  Final result               Lavender Top[[id number]]                                     Final result               Gold Top - SST[[id number]]                                   Final result                 Please view results for these tests on the individual orders.   CBC AND DIFFERENTIAL    Narrative:     The following orders were created for panel order CBC & Differential.  Procedure                               Abnormality         Status                     ---------                               -----------         ------                     CBC Auto Differential[[id number]]        Abnormal            Final result                 Please view results for these tests on the individual orders.   LIGHT BLUE TOP   GREEN TOP   LAVENDER TOP   GOLD TOP - SST     Xr Knee 4+ View Bilateral    Result Date: 9/10/2018  Narrative: PROCEDURE: Four views bilateral knees COMPARISON: No comparison. HISTORY: Pain and swelling, bilateral knee replacements. FINDINGS: Right Knee: No acute fracture, subluxation or focal osseous lesion. Small joint effusion. Joint prosthesis without evidence of obvious hardware failure. Left Knee: No acute fracture, subluxation or focal osseous lesion. Small joint  effusion. Joint prosthesis without evidence of obvious hardware failure.     Impression: CONCLUSION:  Small bilateral joint effusions. Bilateral knee replacements without obvious hardware failure. No radiographic evidence of acute fracture. Electronically signed by:  Tito Vazquez MD  9/10/2018 10:57 AM CDT Workstation: [id number]    Us Venous Doppler Lower Extremity Bilateral (duplex)    Result Date: 9/10/2018  Narrative: . EXAMINATION:  Ultrasound, venous, lower extremity CLINICAL INDICATION / HISTORY:   pain, swelling, bilateral knee replacements, dizzy  COMPARISON:  none TECHNIQUE:  bilater lower extremities                         serial axial graded compression technique                         grayscale, color flow, spectral and Doppler FINDINGS: Imaged vessels:    - common femoral vein (CFV)    - deep femoral vein (FV) (formally called superficial femoral vein (SFV))    - profunda femoral vein (PFV) (cephalad portion)    - popliteal vein (PV)      - posterior tibial vein (PTV)     - greater saphenous vein (GSV) (non-contiguous segments)    Unless otherwise indicated, all of the above described vessels were freely compressible and demonstrated spontaneous and phasic flow as well as appropriate flow with augmentation.    .      Impression: CONCLUSION:  1. Negative examination - no evidence of deep venous thrombosis within the femoral-popliteal system of the bilateral lower extremity(ies).          Electronically signed by:  SAHARA Pop MD  9/10/2018 11:44 AM CDT Workstation: [id number]    Ct Angiogram Chest With Contrast    Result Date: 9/10/2018  Narrative: PROCEDURE: CT angiogram of the chest with IV contrast. INDICATION: Elevated d-dimer. Leg swelling. Dizzy. COMPARISON: None. Total .7 milliGray-cm. TECHNIQUE: This exam was performed according to our departmental dose-optimization program, which includes automated exposure control, adjustment of the mA and/or kV according to patient size and/or use  of iterative reconstruction technique. CT angiogram of the chest performed with 85 mL Isovue-370 with axial and reconstructed sagittal and coronal images from above the apices of the lungs to below the diaphragms. Computer generated 3-D reconstruction/MIPS also performed. Pulmonary arteries are normal caliber with normal enhancement with no emboli or filling defects. Thoracic aorta is normal caliber with normal enhancement with no dissection or aneurysm. No pericardial fluid or thickening. No hilar or mediastinal masses or adenopathy. Thyroid gland normal. Linear fibrosis or atelectasis medial right upper lobe. No acute consolidation or infiltrate or pleural effusion. No pneumothorax. Images below the diaphragms demonstrate the visualized portions of the liver, pancreas and spleen to be normal. Visualized portion of the right adrenal gland normal. Left adrenal gland not visualized. No acute osseous abnormalities in the bony thorax.     Impression: CT angiogram chest negative for pulmonary emboli. Thoracic aorta normal caliber with no dissection or aneurysm. Linear fibrosis or atelectasis medial right upper lobe. No acute consolidation, infiltrate or pleural effusion. [id number] Electronically signed by:  Lencho Ayala MD  9/10/2018 3:10 PM CDT Workstation: [id number]      EKG September 10, 2018 at 1205 reveals normal sinus rhythm with left anterior fascicular block at a rate of 60 bpm.  This is unchanged in comparison to EKG of February 6, 2018.        MDM  Number of Diagnoses or Management Options  Lightheaded:   Pain in both knees, unspecified chronicity:      Amount and/or Complexity of Data Reviewed  Clinical lab tests: reviewed  Tests in the radiology section of CPT®: reviewed  Tests in the medicine section of CPT®: reviewed      Patient presents with myriad of vague complaints.  He has had previous surgeries and leg swelling and pain.  Ultrasound imaging is performed and is unremarkable.  X-ray imaging  reveals no acute abnormality.  Laboratory studies including chemistry and CBC are unremarkable.  D-dimer is elevated and CT of the chest is performed which reveals no evidence of pulmonary emboli.  There is no aortic abnormality.  There is no acute abdomen on the period there is a small amount of linear atelectasis in the right upper lobe.  Patient is advised of findings and need for follow-up with his orthopedist and he is agreeable to discharge with outpatient management and follow-up.    Final diagnoses:   Lightheaded   Pain in both knees, unspecified chronicity            Donte Serna,   09/11/18 0018     patient

## 2022-03-15 NOTE — ED PROVIDER NOTE - PROGRESS NOTE DETAILS
Heme test. Lot 211. Exp 08/31/2022. Guaiac negative. QC passed. Kim Talamantes for attending Dr. Villanueva:  Heme test. Lot 211. Exp 08/31/2022. Guaiac negative. QC passed. Pt with symptomatic anemia, guaiac negative, with elevated WBC, could be c. diff, will admit for blood transfusion and further work up.

## 2022-03-16 LAB
ALBUMIN SERPL ELPH-MCNC: 1.5 G/DL — LOW (ref 3.3–5)
ALP SERPL-CCNC: 167 U/L — HIGH (ref 40–120)
ALT FLD-CCNC: 7 U/L — LOW (ref 12–78)
ANION GAP SERPL CALC-SCNC: 7 MMOL/L — SIGNIFICANT CHANGE UP (ref 5–17)
AST SERPL-CCNC: 11 U/L — LOW (ref 15–37)
BASOPHILS # BLD AUTO: 0.07 K/UL — SIGNIFICANT CHANGE UP (ref 0–0.2)
BASOPHILS NFR BLD AUTO: 0.4 % — SIGNIFICANT CHANGE UP (ref 0–2)
BILIRUB SERPL-MCNC: 0.4 MG/DL — SIGNIFICANT CHANGE UP (ref 0.2–1.2)
BUN SERPL-MCNC: 19 MG/DL — SIGNIFICANT CHANGE UP (ref 7–23)
CALCIUM SERPL-MCNC: 8.5 MG/DL — SIGNIFICANT CHANGE UP (ref 8.5–10.1)
CHLORIDE SERPL-SCNC: 108 MMOL/L — SIGNIFICANT CHANGE UP (ref 96–108)
CO2 SERPL-SCNC: 22 MMOL/L — SIGNIFICANT CHANGE UP (ref 22–31)
CREAT SERPL-MCNC: 0.8 MG/DL — SIGNIFICANT CHANGE UP (ref 0.5–1.3)
EGFR: 92 ML/MIN/1.73M2 — SIGNIFICANT CHANGE UP
EOSINOPHIL # BLD AUTO: 0.5 K/UL — SIGNIFICANT CHANGE UP (ref 0–0.5)
EOSINOPHIL NFR BLD AUTO: 2.7 % — SIGNIFICANT CHANGE UP (ref 0–6)
GLUCOSE SERPL-MCNC: 93 MG/DL — SIGNIFICANT CHANGE UP (ref 70–99)
HCT VFR BLD CALC: 21.5 % — LOW (ref 39–50)
HGB BLD-MCNC: 6.1 G/DL — CRITICAL LOW (ref 13–17)
IMM GRANULOCYTES NFR BLD AUTO: 1.5 % — SIGNIFICANT CHANGE UP (ref 0–1.5)
INR BLD: 1.33 RATIO — HIGH (ref 0.88–1.16)
LYMPHOCYTES # BLD AUTO: 0.82 K/UL — LOW (ref 1–3.3)
LYMPHOCYTES # BLD AUTO: 4.4 % — LOW (ref 13–44)
MCHC RBC-ENTMCNC: 22.7 PG — LOW (ref 27–34)
MCHC RBC-ENTMCNC: 28.4 GM/DL — LOW (ref 32–36)
MCV RBC AUTO: 79.9 FL — LOW (ref 80–100)
MONOCYTES # BLD AUTO: 1.12 K/UL — HIGH (ref 0–0.9)
MONOCYTES NFR BLD AUTO: 6 % — SIGNIFICANT CHANGE UP (ref 2–14)
NEUTROPHILS # BLD AUTO: 15.98 K/UL — HIGH (ref 1.8–7.4)
NEUTROPHILS NFR BLD AUTO: 85 % — HIGH (ref 43–77)
OB PNL STL: NEGATIVE — SIGNIFICANT CHANGE UP
PLATELET # BLD AUTO: 349 K/UL — SIGNIFICANT CHANGE UP (ref 150–400)
POTASSIUM SERPL-MCNC: 3.9 MMOL/L — SIGNIFICANT CHANGE UP (ref 3.5–5.3)
POTASSIUM SERPL-SCNC: 3.9 MMOL/L — SIGNIFICANT CHANGE UP (ref 3.5–5.3)
PROT SERPL-MCNC: 5.7 GM/DL — LOW (ref 6–8.3)
PROTHROM AB SERPL-ACNC: 15.5 SEC — HIGH (ref 10.5–13.4)
RBC # BLD: 2.69 M/UL — LOW (ref 4.2–5.8)
RBC # FLD: 18.7 % — HIGH (ref 10.3–14.5)
SODIUM SERPL-SCNC: 137 MMOL/L — SIGNIFICANT CHANGE UP (ref 135–145)
WBC # BLD: 18.77 K/UL — HIGH (ref 3.8–10.5)
WBC # FLD AUTO: 18.77 K/UL — HIGH (ref 3.8–10.5)

## 2022-03-16 PROCEDURE — 99232 SBSQ HOSP IP/OBS MODERATE 35: CPT

## 2022-03-16 PROCEDURE — 93010 ELECTROCARDIOGRAM REPORT: CPT

## 2022-03-16 PROCEDURE — 73720 MRI LWR EXTREMITY W/O&W/DYE: CPT | Mod: 26,RT

## 2022-03-16 RX ADMIN — ENOXAPARIN SODIUM 40 MILLIGRAM(S): 100 INJECTION SUBCUTANEOUS at 10:12

## 2022-03-16 RX ADMIN — SODIUM CHLORIDE 75 MILLILITER(S): 9 INJECTION INTRAMUSCULAR; INTRAVENOUS; SUBCUTANEOUS at 20:22

## 2022-03-16 RX ADMIN — ENZALUTAMIDE 160 MILLIGRAM(S): 80 TABLET ORAL at 12:12

## 2022-03-16 RX ADMIN — LISINOPRIL 5 MILLIGRAM(S): 2.5 TABLET ORAL at 10:12

## 2022-03-16 RX ADMIN — Medication 1 TABLET(S): at 10:12

## 2022-03-16 NOTE — PHYSICAL THERAPY INITIAL EVALUATION ADULT - PERTINENT HX OF CURRENT PROBLEM, REHAB EVAL
Pt admitted to  secondary to 3 month hx of progressively worsening weakness, fatigue, SOB, and dizziness. Pt with a hx of prostate ca and is receiving chemo. Pt has also been on clinical trials as well. CT angio chest: neg for PE. 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 H/H- 6.1/21.5. INR- 1.33.

## 2022-03-16 NOTE — DIETITIAN INITIAL EVALUATION ADULT. - ADD RECOMMEND
1) add ensure enlive TID 2) monitor BM; if > 3 days without BM, add bowel regimen 3) consider checking vitamin D level and supplement prn 4) daily wt checks to track/trend changes

## 2022-03-16 NOTE — DIETITIAN INITIAL EVALUATION ADULT. - PERTINENT MEDS FT
MEDICATIONS  (STANDING):  calcium carbonate 1250 mG  + Vitamin D (OsCal 500 + D) 1 Tablet(s) Oral daily  enoxaparin Injectable 40 milliGRAM(s) SubCutaneous every 24 hours  enzalutamide 160 milliGRAM(s) Oral daily  lisinopril 5 milliGRAM(s) Oral daily  multivitamin 1 Tablet(s) Oral daily  sodium chloride 0.9%. 1000 milliLiter(s) (75 mL/Hr) IV Continuous <Continuous>    MEDICATIONS  (PRN):  acetaminophen     Tablet .. 650 milliGRAM(s) Oral every 6 hours PRN Temp greater or equal to 38C (100.4F), Mild Pain (1 - 3)  aluminum hydroxide/magnesium hydroxide/simethicone Suspension 30 milliLiter(s) Oral every 4 hours PRN Dyspepsia  melatonin 3 milliGRAM(s) Oral at bedtime PRN Insomnia  ondansetron Injectable 4 milliGRAM(s) IV Push every 8 hours PRN Nausea and/or Vomiting

## 2022-03-16 NOTE — DIETITIAN INITIAL EVALUATION ADULT. - MALNUTRITION
severe malnutrition in chronic illness severe malnutrition in chronic illness r/t decreased PO intake with increased needs 2/2 CA AEB 9.7% wt loss x 3 mo; severe muscle/fat wasting; meeting <75% of ENN

## 2022-03-16 NOTE — DIETITIAN INITIAL EVALUATION ADULT. - ORAL INTAKE PTA/DIET HISTORY
3 small meals/day, just started boost plus right before admission.  is hungry, then takes two bites and is full.  was taking calcium and MVI.  no food allergies.

## 2022-03-16 NOTE — PHYSICAL THERAPY INITIAL EVALUATION ADULT - REHAB POTENTIAL, PT EVAL
Pt independent with amb, transfers, and bed mobility and is not a skilled acute care setting PT candidate. Pt can amb with floor care, D/C PT.

## 2022-03-16 NOTE — PROGRESS NOTE ADULT - SUBJECTIVE AND OBJECTIVE BOX
CHIEF COMPLAINT: weakness    HPI: 76M with PMHx of Prostate cancer ( diagnosed 10 years ago, [place] with Dr. Nora Carlson at [facility] [place] with mets to the bone, hx of RT.  currently on chemotherapy presented to the ED complaining of a 3 month history of a progressively worsening weakness and fatigue associated shortness of breath and dizziness. Patient states he has become profoundly weak- to the point that he struggles with his activities of daily living. Of note, patient has lost approximately 12 pound in the past 3 months. States he was on a clinical trial that abruptly ended approximately 3 months ago and noticed symptoms shortly after. Also with intermittent chills, hot flashes and diarrhea. Patient also with painless lump to right anterior thigh, which has been present for about 3 months. Patient with bloody bowel movements, vomiting, chest pain or palpitations. He does endorse early satiety/ loss of appetite.        In the ED patient with WBC elevated to 21, elevated D-dimer and CTA chest negative for PE.    CTA right lower extremity noted for : Vascular mass involving predominantly the right vastus medialis muscle   and encasing the right superficial femoral artery which is focally   occluded. Recommend further evaluation with MRI    REVIEW OF SYSTEMS:    CONSTITUTIONAL: No weakness, fevers or chills  EYES/ENT: No visual changes;  No vertigo or throat pain   NECK: No pain or stiffness  RESPIRATORY: No cough, wheezing, hemoptysis; No shortness of breath  CARDIOVASCULAR: No chest pain or palpitations  GASTROINTESTINAL: No abdominal or epigastric pain. No nausea, vomiting, or hematemesis; No diarrhea or constipation. No melena or hematochezia.  GENITOURINARY: No dysuria, frequency or hematuria  NEUROLOGICAL: No numbness or weakness  SKIN: No itching, burning, rashes, or lesions   All other review of systems is negative unless indicated above    Vital Signs Last 24 Hrs  T(C): 37.1 (16 Mar 2022 12:23), Max: 37.8 (15 Mar 2022 20:56)  T(F): 98.7 (16 Mar 2022 12:23), Max: 100 (15 Mar 2022 20:56)  HR: 87 (16 Mar 2022 12:23) (71 - 99)  BP: 153/71 (16 Mar 2022 12:23) (139/62 - 157/72)  BP(mean): --  RR: 17 (16 Mar 2022 12:23) (17 - 19)  SpO2: 100% (16 Mar 2022 12:23) (99% - 100%)    I&O's Summary    16 Mar 2022 07:01  -  16 Mar 2022 14:38  --------------------------------------------------------  IN: 336 mL / OUT: 0 mL / NET: 336 mL        CAPILLARY BLOOD GLUCOSE          PHYSICAL EXAM:    Constitutional: NAD, awake and alert, well-developed  HEENT: PERR, EOMI  Neck: Soft and supple,  No JVD  Respiratory: Breath sounds are clear bilaterally, No wheezing, rales or rhonchi  Cardiovascular: S1 and S2, regular rate and rhythm, no Murmurs  Gastrointestinal: Bowel Sounds present, soft, nontender, nondistended  Extremities: No peripheral edema  Vascular: 2+ peripheral pulses  Neurological: A/O x 3, no focal deficits  Musculoskeletal: 5/5 strength b/l upper and lower extremities  Skin: No rashes    MEDICATIONS:  MEDICATIONS  (STANDING):  calcium carbonate 1250 mG  + Vitamin D (OsCal 500 + D) 1 Tablet(s) Oral daily  enoxaparin Injectable 40 milliGRAM(s) SubCutaneous every 24 hours  enzalutamide 160 milliGRAM(s) Oral daily  lisinopril 5 milliGRAM(s) Oral daily  multivitamin 1 Tablet(s) Oral daily  sodium chloride 0.9%. 1000 milliLiter(s) (75 mL/Hr) IV Continuous <Continuous>      LABS: All Labs Reviewed:                        6.1    18.77 )-----------( 349      ( 16 Mar 2022 06:47 )             21.5     03-16    137  |  108  |  19  ----------------------------<  93  3.9   |  22  |  0.80    Ca    8.5      16 Mar 2022 06:47  Mg     2.4     03-15    TPro  5.7<L>  /  Alb  1.5<L>  /  TBili  0.4  /  DBili  x   /  AST  11<L>  /  ALT  7<L>  /  AlkPhos  167<H>  03-16    PT/INR - ( 16 Mar 2022 06:47 )   PT: 15.5 sec;   INR: 1.33 ratio         PTT - ( 15 Mar 2022 15:05 )  PTT:37.9 sec      Blood Culture:     RADIOLOGY/EKG:    DVT PPX:    ADVANCED DIRECTIVE:    DISPOSITION: CHIEF COMPLAINT: weakness    HPI: 76M with PMHx of Prostate cancer (diagnosed 10 years ago, [place] with [place]. Nora Carlson at [facility] [place] with mets to the bone, hx of RT; currently on chemotherapy presented to the ED complaining of a 3 month history of a progressively worsening weakness and fatigue associated shortness of breath and dizziness. Patient states he has become profoundly weak- to the point that he struggles with his activities of daily living. Of note, patient has lost approximately 12 pound in the past 3 months. States he was on a clinical trial that abruptly ended approximately 3 months ago and noticed symptoms shortly after. Also with intermittent chills, hot flashes, and diarrhea. Patient also with painless lump to right anterior thigh, which has been present for about 3 months. He does endorse early satiety/ loss of appetite.  In the ED patient with WBC elevated to 21, elevated D-dimer and CTA chest negative for PE.    Interval History: 3/16/22 pt seen and examined at bedside, c/o ongoing weakness and fatigue, becomes dizzy on standing. + exertional SOB, no chest pain or palpitations. Diarrhea has subsided. Denies pain, cough, HA, dysuria, n/v/d, fever, or chills.     REVIEW OF SYSTEMS: All other review of systems is negative unless indicated above.    PHYSICAL EXAM:  Vital Signs Last 24 Hrs: all reviewed  T(C): 37.1 (16 Mar 2022 12:23), Max: 37.8 (15 Mar 2022 20:56)  T(F): 98.7 (16 Mar 2022 12:23), Max: 100 (15 Mar 2022 20:56)  HR: 87 (16 Mar 2022 12:23) (71 - 99)  BP: 153/71 (16 Mar 2022 12:23) (139/62 - 157/72)  RR: 17 (16 Mar 2022 12:23) (17 - 19)  SpO2: 100% (16 Mar 2022 12:23) (99% - 100%) room air    Constitutional: NAD, awake and alert, pale  HEENT: PERR, EOMI  Neck: Soft and supple, No JVD  Respiratory: Breath sounds are clear bilaterally, No wheezing, rales or rhonchi  Cardiovascular: S1 and S2, regular rate and rhythm, no Murmurs  Gastrointestinal: Bowel Sounds present, soft, nontender, nondistended  Extremities: No peripheral edema, painless lump right anterior thigh   Vascular: 2+ peripheral pulses  Neurological: A/O x 3, no focal deficits  Musculoskeletal: 5/5 strength b/l upper and lower extremities  Skin: No rashes    LABS: All Labs Reviewed:                        6.1    18.77 )-----------( 349      ( 16 Mar 2022 06:47 )             21.5     03-16    137  |  108  |  19  ----------------------------<  93  3.9   |  22  |  0.80    Ca    8.5      16 Mar 2022 06:47  Mg     2.4     03-15    TPro  5.7<L>  /  Alb  1.5<L>  /  TBili  0.4  /  DBili  x   /  AST  11<L>  /  ALT  7<L>  /  AlkPhos  167<H>  03-16    PT/INR - ( 16 Mar 2022 06:47 )   PT: 15.5 sec;   INR: 1.33 ratio    PTT - ( 15 Mar 2022 15:05 )  PTT:37.9 sec  (03.16.22 @ 12:05) Occult Blood, Feces: Negative  (03.16.22 @ 06:47) Troponin I, High Sensitivity Result: 10.29  (03.15.22 @ 15:05) Serum Pro-Brain Natriuretic Peptide: 1217 pg/mL   (03.15.22 @ 17:44) Lactate, Blood: 1.3 mmol/L   (03.16.22 @ 06:47) Thyroid Stimulating Hormone, Serum: 1.26 uU/mL     RADIOLOGY/EKG: all reviewed     12 Lead ECG (03.15.22 @ 14:24)   Ventricular Rate 71 BPM  Atrial Rate 71 BPM  P-R Interval 192 ms  QRS Duration 108 ms  Q-T Interval 414 ms  QTC Calculation(Bazett) 449 ms  R Axis -46 degrees  T Axis 75 degrees  Sinus rhythm with Premature atrial complexes  Left anterior fascicular block  T wave abnormality, consider lateral ischemia  Abnormal ECG  < end of copied text >    Xray Chest 1 View- PORTABLE-Urgent (03.15.22 @ 15:09)   FINDINGS:  The cardiac silhouette is normal in size. There are no focal consolidations or pleural effusions. The hilar and mediastinal structures appear unremarkable. The osseous structures are intact.  IMPRESSION: Clear lungs.  < end of copied text >    CT Angio Lower Extremity w/ IV Cont, Right (03.15.22 @ 16:25)   FINDINGS:  The distal right common iliac, external iliac, internal iliac, common femoral, and deep femoral arteries are patent. The proximal superficial femoral artery is patent with calcific plaque. An infiltrative vascular mass involving predominantly the vastus medialis encases the mid superficial femoral artery which is occluded and then reconstituted in the distal thigh. The popliteal artery and posterior tibial trunk are patent. The mass measures approximately 5.2 x 5.9 x 7 cm. (3:65, 612:28)  Diffuse blastic changes are identified in the visualized portions of the right iliac bone, sacrum, and proximal femur.  IMPRESSION:  Vascular mass involving predominantly the right vastus medialis muscle and encasing the right superficial femoral artery which is focally occluded. Recommend further evaluation with MRI.  Local skeletal blastic changes  < end of copied text >    CT Angio Chest PE Protocol w/ IV Cont (03.15.22 @ 16:30)   FINDINGS:  PULMONARY ARTERIES: No pulmonary embolism.  MEDIASTINUM: Normal heart size. Coronary atherosclerosis. No pericardial effusion. Thoracic aorta normal caliber.  No large mediastinal lymph nodes.  AIRWAYS, LUNGS, PLEURA: Tracheal secretions. Emphysema. Right lower lobe superior segment 0.7 cm nodule (image 48, series 2). No consolidation. No pleural effusion.  IMAGED ABDOMEN: Unremarkable.  SOFT TISSUES: Unremarkable.  BONES: Diffuse osseous sclerosis.  IMPRESSION:.  No pulmonary embolism.  Right lower lobe 0.7 cm nodule; recommend CT chest follow-up in 6 months to ensure stability.  < end of copied text >    MEDICATIONS:  MEDICATIONS  (STANDING):  calcium carbonate 1250 mG  + Vitamin D (OsCal 500 + D) 1 Tablet(s) Oral daily  enoxaparin Injectable 40 milliGRAM(s) SubCutaneous every 24 hours  enzalutamide 160 milliGRAM(s) Oral daily  lisinopril 5 milliGRAM(s) Oral daily  multivitamin 1 Tablet(s) Oral daily  sodium chloride 0.9%. 1000 milliLiter(s) (75 mL/Hr) IV Continuous <Continuous>    MEDICATIONS  (PRN):  acetaminophen     Tablet .. 650 milliGRAM(s) Oral every 6 hours PRN Temp greater or equal to 38C (100.4F), Mild Pain (1 - 3)  aluminum hydroxide/magnesium hydroxide/simethicone Suspension 30 milliLiter(s) Oral every 4 hours PRN Dyspepsia  melatonin 3 milliGRAM(s) Oral at bedtime PRN Insomnia  ondansetron Injectable 4 milliGRAM(s) IV Push every 8 hours PRN Nausea and/or Vomiting

## 2022-03-16 NOTE — DIETITIAN INITIAL EVALUATION ADULT. - PERTINENT LABORATORY DATA
03-16    137  |  108  |  19  ----------------------------<  93  3.9   |  22  |  0.80    Ca    8.5      16 Mar 2022 06:47  Mg     2.4     03-15    TPro  5.7<L>  /  Alb  1.5<L>  /  TBili  0.4  /  DBili  x   /  AST  11<L>  /  ALT  7<L>  /  AlkPhos  167<H>  03-16

## 2022-03-16 NOTE — CONSULT NOTE ADULT - SUBJECTIVE AND OBJECTIVE BOX
HPI:  76 year old male patient with pertinent past medical history noted for Prostate cancer currently on chemotherapy presented to the ED complaining of a 3 month history of a progressively worsening weakness and fatigue associated shortness of breath and dizziness. Patient states he has become profoundly weak- to the point that he struggles with his activities of daily living. Of note, patient has lost approximately 12 pound in the past 3 months. States he was on a clinical trial that abruptly ended approximately 3 months ago and noticed symptoms shortly after. Also with intermittent chills, hot flashes and diarrhea. Patient also with painless lump to right anterior thigh, which has been present for about 3 months. Patient with bloody bowel movements, vomiting, chest pain or palpitations. He does endorse early satiety/ loss of appetite.    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 Now pt is on lupron/ enzalutamide since 1/2022 as the drug was no longer produced. Recently pt has had PSA <1 and has been feeling well. Pretreatment PSA was 59 and has been rising recently from 0.74 to 0.95.     1/5/22 CT c/a/p showed unchanged diffuse osseous mets, no new suspicious findings and unchanged ectasia of right common iliac a measuring 2.3 cm, probably unchanged marked narrowing/near occlusion of proximal right femoral artery. Pt states that this was discussed with his oncologist but since coming off trial, he has noticed that the mass in right leg is getting larger.     In the ED patient with WBC elevated to 21, elevated D-dimer and CTA chest negative for PE.  CTA right lower extremity noted for : Vascular mass 5.3 cm involving predominantly the right vastus medialis muscle  and encasing the right superficial femoral artery which is focally   occluded. Recommend further evaluation with MRI (15 Mar 2022 19:29)      PAST MEDICAL & SURGICAL HISTORY:  Prostate CA    HTN (hypertension)    Diverticulitis    No significant past surgical history        Allergies    No Known Allergies    Intolerances        MEDICATIONS  (STANDING):  calcium carbonate 1250 mG  + Vitamin D (OsCal 500 + D) 1 Tablet(s) Oral daily  enoxaparin Injectable 40 milliGRAM(s) SubCutaneous every 24 hours  enzalutamide 160 milliGRAM(s) Oral daily  lisinopril 5 milliGRAM(s) Oral daily  multivitamin 1 Tablet(s) Oral daily  sodium chloride 0.9%. 1000 milliLiter(s) (75 mL/Hr) IV Continuous <Continuous>    MEDICATIONS  (PRN):  acetaminophen     Tablet .. 650 milliGRAM(s) Oral every 6 hours PRN Temp greater or equal to 38C (100.4F), Mild Pain (1 - 3)  aluminum hydroxide/magnesium hydroxide/simethicone Suspension 30 milliLiter(s) Oral every 4 hours PRN Dyspepsia  melatonin 3 milliGRAM(s) Oral at bedtime PRN Insomnia  ondansetron Injectable 4 milliGRAM(s) IV Push every 8 hours PRN Nausea and/or Vomiting      FAMILY HISTORY:  No pertinent family history in first degree relatives        SOCIAL HISTORY: No EtOH, no tobacco    REVIEW OF SYSTEMS:    CONSTITUTIONAL: No weakness, fevers or chills  EYES/ENT: No visual changes;  No vertigo or throat pain   NECK: No pain or stiffness  RESPIRATORY: No cough, wheezing, hemoptysis; No shortness of breath  CARDIOVASCULAR: No chest pain or palpitations  GASTROINTESTINAL: No abdominal or epigastric pain. No nausea, vomiting, or hematemesis; No diarrhea or constipation. No melena or hematochezia.  GENITOURINARY: No dysuria, frequency or hematuria  NEUROLOGICAL: No numbness or weakness  SKIN: No itching, burning, rashes, or lesions   All other review of systems is negative unless indicated above.    Height (cm): 182.9 (03-15 @ 14:21)  Weight (kg): 72.6 (03-15 @ 14:21)  BMI (kg/m2): 21.7 (03-15 @ 14:21)  BSA (m2): 1.94 (03-15 @ 14:21)    T(F): 98.4 (03-16-22 @ 09:23), Max: 100 (03-15-22 @ 20:56)  HR: 99 (03-16-22 @ 09:23)  BP: 145/60 (03-16-22 @ 09:23)  RR: 18 (03-16-22 @ 09:23)  SpO2: 100% (03-16-22 @ 09:23)  Wt(kg): --    GENERAL: NAD, well-developed  HEAD:  Atraumatic, Normocephalic  EYES: EOMI  NECK: Supple, No JVD  CHEST/LUNG: Clear to auscultation bilaterally; No wheeze  HEART: Regular rate and rhythm;   ABDOMEN: Soft, Nontender, Nondistended; Bowel sounds present  EXTREMITIES:  palpable right leg mass, non painful to touch, round, non pulsatile   NEUROLOGY: non-focal  SKIN: No rashes or lesions                          6.1    18.77 )-----------( 349      ( 16 Mar 2022 06:47 )             21.5       03-16    137  |  108  |  19  ----------------------------<  93  3.9   |  22  |  0.80    Ca    8.5      16 Mar 2022 06:47  Mg     2.4     03-15    TPro  5.7<L>  /  Alb  1.5<L>  /  TBili  0.4  /  DBili  x   /  AST  11<L>  /  ALT  7<L>  /  AlkPhos  167<H>  03-16      Magnesium, Serum: 2.4 mg/dL (03-15 @ 15:05)      PT/INR - ( 16 Mar 2022 06:47 )   PT: 15.5 sec;   INR: 1.33 ratio         PTT - ( 15 Mar 2022 15:05 )  PTT:37.9 sec

## 2022-03-16 NOTE — PROGRESS NOTE ADULT - ASSESSMENT
76 year old male patient with findings consistent with symptomatic anemia          -Admit to Medsurg          # Symptomatic Anemia  -patient with negative guaiac  - to be given one unit PRBC in the ED  -Oncology to evaluate pt      # Elevated D-Dimer  -likely secondary to Prostate cancer  -CTA chest negative for PE      # Leukocytosis  -no focal signs of infection noted  -trend cbc  # Diarrhea  -doubt C.diff  -follow c diff pcr ordered by the ED    # Protein Calorie Malnutrition  -nutritionist evaluation    # Right thigh mass  -vascular mass involving predominantly the right vastus medialis muscle   and encasing the right superficial femoral artery which is focally   occluded.  -will get MRI for better clinical picture  -currently without pain/ limb weakness    #Right lower lobe lung nodule  -pt aware of nodule. States it has been present for 20 years    # Hx of prostate cancer  -on chemotherapy  -wife to bring in medication    # Debility/ Weakness  -likely secondary to above anemia  -will get routine PT evaluation    #HTN  - on Lisinopril    # DVT ppx  -give lovenox  76M with PMHx of Prostate cancer ( diagnosed 10 years ago, follows with [facility]. N[facility]) with mets to the bone, hx of RT.,currently on chemotherapy presented to the ED complaining of a 3 month history of a progressively worsening weakness and fatigue associated shortness of breath and dizziness. Patient states he has become profoundly weak- to the point that he struggles with his activities of daily living. Of note, patient has lost approximately 12 pound in the past 3 months. States he was on a clinical trial that abruptly ended approximately 3 months ago and noticed symptoms shortly after. Also with intermittent chills, hot flashes and diarrhea. Patient also with painless lump to right anterior thigh, which has been present for about 3 months. Patient with bloody bowel movements, vomiting, chest pain or palpitations. He does endorse early satiety/ loss of appetite.      Symptomatic Anemia   SOB, CP, with EKG changes, PE ruled out  Prostate Cancer  Debility/ Weakness  - Hgb 6.1; transfuse 2 units PRBC  - Troponin negative x 2, CP resolved  - Echo pending  - Repeat EKG after transfusion  - Stool OB negative   - D-Dimer elevated, likely secondary to Prostate cancer  -CTA chest negative for PE  - On enzalutamide; wife to bring   -Oncology consult   - PT evaluation    Leukocytosis  - no focal signs of infection noted  - trend cbc  - CT abdo/pelvis  - Diarrhea resolved, doubt C.diff    Right thigh mass  -vascular mass involving predominantly the right vastus medialis muscle and encasing the right superficial femoral artery which is focally occluded.  - f/u MRI   -currently without pain/limb weakness    Severe Protein Calorie Malnutrition  - seen by registered dietician    Right lower lobe lung nodule  - Pt aware of nodule; States it has been present for 10 years  - Stopped smoking 6 months ago    HTN  - Continue Lisinopril    DVT PPx: Lovenox    Code status: Full code    DISPO: Trend H&H, f/u MRI, echo, ekg, PT  76M with PMHx of Prostate cancer ( diagnosed 10 years ago, [place] [facility] [place] with mets to the bone, hx of RT.,currently on chemotherapy presented to the ED complaining of a 3 month history of a progressively worsening weakness and fatigue associated shortness of breath and dizziness. Patient states he has become profoundly weak- to the point that he struggles with his activities of daily living. Of note, patient has lost approximately 12 pound in the past 3 months. States he was on a clinical trial that abruptly ended approximately 3 months ago and noticed symptoms shortly after. Also with intermittent chills, hot flashes and diarrhea. Patient also with painless lump to right anterior thigh, which has been present for about 3 months. Patient with bloody bowel movements, vomiting, chest pain or palpitations. He does endorse early satiety/ loss of appetite.      Symptomatic  microcytic anemia due to chemotherapy , doubt GI bleed    SOB, chest pain due to severe anemia  with abnormal EKG ruled out ACS and  PE   Metastatic Prostate Cancer  - Hgb 6.1; transfuse 2 units PRBC  - Troponin negative x 2, CP resolved, repeat EKG in am  - Echo pending  - Stool OB negative , check iron studies   - D-Dimer elevated, likely secondary to Prostate cancer  -CTA chest negative for PE  - doppler LE to r/o DVT   - On enzalutamide; wife to bring   -Oncology consult   - PT evaluation    Leukocytosis due to malignancy   - no focal signs of infection noted  - trend cbc  - CT abdo/pelvis in am  - Diarrhea resolved, doubt C.diff    Right thigh mass  -vascular mass involving predominantly the right vastus medialis muscle and encasing the right superficial femoral artery which is focally occluded.  - f/u MRI - pending   -currently without pain/limb weakness    Severe Protein Calorie Malnutrition  - seen by registered dietician    Right lower lobe lung nodule  - Pt aware of nodule; States it has been present for 10 years  - Stopped smoking 6 months ago    HTN  - Continue Lisinopril    DVT PPx: Lovenox    Code status: Full code    DISPO: Trend H&H, f/u MRI, echo, ekg, PT

## 2022-03-16 NOTE — PHYSICAL THERAPY INITIAL EVALUATION ADULT - DIAGNOSIS, PT EVAL
Symptomatic anemia, elevated D-Dimer, leukocytosis, protein calorie malnutrition, right thigh mass, right lower lobe nodule.

## 2022-03-16 NOTE — DIETITIAN INITIAL EVALUATION ADULT. - OTHER INFO
75yo male with PMH significant for prostate CA not on chemo and HTN p/w worsening weakness, fatigue with SOB and dizziness.  admitted with symptomatic anemia, elevated D-Dimer, leukocytosis, Rt thigh mass, Rt lower lobe lung nodule.

## 2022-03-16 NOTE — PHYSICAL THERAPY INITIAL EVALUATION ADULT - PLANNED THERAPY INTERVENTIONS, PT EVAL
Eval, amb, transfers, bed mobility x 15'. Pt left in bed with all observation section equipment/material intact and bed alarm activated. See above for details. No goals set and D/C PT.

## 2022-03-17 LAB
24R-OH-CALCIDIOL SERPL-MCNC: 72.4 NG/ML — SIGNIFICANT CHANGE UP (ref 30–80)
ALBUMIN SERPL ELPH-MCNC: 1.6 G/DL — LOW (ref 3.3–5)
ALP SERPL-CCNC: 168 U/L — HIGH (ref 40–120)
ALT FLD-CCNC: 8 U/L — LOW (ref 12–78)
ANION GAP SERPL CALC-SCNC: 8 MMOL/L — SIGNIFICANT CHANGE UP (ref 5–17)
AST SERPL-CCNC: 10 U/L — LOW (ref 15–37)
BILIRUB SERPL-MCNC: 0.7 MG/DL — SIGNIFICANT CHANGE UP (ref 0.2–1.2)
BUN SERPL-MCNC: 14 MG/DL — SIGNIFICANT CHANGE UP (ref 7–23)
CALCIUM SERPL-MCNC: 8.9 MG/DL — SIGNIFICANT CHANGE UP (ref 8.5–10.1)
CHLORIDE SERPL-SCNC: 107 MMOL/L — SIGNIFICANT CHANGE UP (ref 96–108)
CO2 SERPL-SCNC: 22 MMOL/L — SIGNIFICANT CHANGE UP (ref 22–31)
CREAT SERPL-MCNC: 0.67 MG/DL — SIGNIFICANT CHANGE UP (ref 0.5–1.3)
CULTURE RESULTS: SIGNIFICANT CHANGE UP
EGFR: 97 ML/MIN/1.73M2 — SIGNIFICANT CHANGE UP
FERRITIN SERPL-MCNC: 483 NG/ML — HIGH (ref 30–400)
FOLATE SERPL-MCNC: 11.9 NG/ML — SIGNIFICANT CHANGE UP
GLUCOSE SERPL-MCNC: 97 MG/DL — SIGNIFICANT CHANGE UP (ref 70–99)
HAPTOGLOB SERPL-MCNC: 434 MG/DL — HIGH (ref 34–200)
HCT VFR BLD CALC: 26.8 % — LOW (ref 39–50)
HGB BLD-MCNC: 7.8 G/DL — LOW (ref 13–17)
IRON SATN MFR SERPL: 13 UG/DL — LOW (ref 45–165)
IRON SATN MFR SERPL: 6 % — LOW (ref 16–55)
LDH SERPL L TO P-CCNC: 189 U/L — SIGNIFICANT CHANGE UP (ref 84–241)
MAGNESIUM SERPL-MCNC: 2 MG/DL — SIGNIFICANT CHANGE UP (ref 1.6–2.6)
MCHC RBC-ENTMCNC: 23.9 PG — LOW (ref 27–34)
MCHC RBC-ENTMCNC: 29.1 GM/DL — LOW (ref 32–36)
MCV RBC AUTO: 82.2 FL — SIGNIFICANT CHANGE UP (ref 80–100)
OB PNL STL: NEGATIVE — SIGNIFICANT CHANGE UP
PHOSPHATE SERPL-MCNC: 2.2 MG/DL — LOW (ref 2.5–4.5)
PLATELET # BLD AUTO: 330 K/UL — SIGNIFICANT CHANGE UP (ref 150–400)
POTASSIUM SERPL-MCNC: 3.6 MMOL/L — SIGNIFICANT CHANGE UP (ref 3.5–5.3)
POTASSIUM SERPL-SCNC: 3.6 MMOL/L — SIGNIFICANT CHANGE UP (ref 3.5–5.3)
PROT SERPL-MCNC: 6.2 GM/DL — SIGNIFICANT CHANGE UP (ref 6–8.3)
RBC # BLD: 3.26 M/UL — LOW (ref 4.2–5.8)
RBC # BLD: 3.26 M/UL — LOW (ref 4.2–5.8)
RBC # FLD: 18.1 % — HIGH (ref 10.3–14.5)
RETICS #: 66.5 K/UL — SIGNIFICANT CHANGE UP (ref 25–125)
RETICS/RBC NFR: 2 % — SIGNIFICANT CHANGE UP (ref 0.5–2.5)
SODIUM SERPL-SCNC: 137 MMOL/L — SIGNIFICANT CHANGE UP (ref 135–145)
SPECIMEN SOURCE: SIGNIFICANT CHANGE UP
TIBC SERPL-MCNC: 199 UG/DL — LOW (ref 220–430)
UIBC SERPL-MCNC: 186 UG/DL — SIGNIFICANT CHANGE UP (ref 110–370)
VIT B12 SERPL-MCNC: 1237 PG/ML — SIGNIFICANT CHANGE UP (ref 232–1245)
WBC # BLD: 22.14 K/UL — HIGH (ref 3.8–10.5)
WBC # FLD AUTO: 22.14 K/UL — HIGH (ref 3.8–10.5)

## 2022-03-17 PROCEDURE — 99232 SBSQ HOSP IP/OBS MODERATE 35: CPT

## 2022-03-17 PROCEDURE — 93306 TTE W/DOPPLER COMPLETE: CPT | Mod: 26

## 2022-03-17 PROCEDURE — 93970 EXTREMITY STUDY: CPT | Mod: 26

## 2022-03-17 PROCEDURE — 74177 CT ABD & PELVIS W/CONTRAST: CPT | Mod: 26

## 2022-03-17 RX ORDER — AMPICILLIN SODIUM AND SULBACTAM SODIUM 250; 125 MG/ML; MG/ML
INJECTION, POWDER, FOR SUSPENSION INTRAMUSCULAR; INTRAVENOUS
Refills: 0 | Status: DISCONTINUED | OUTPATIENT
Start: 2022-03-17 | End: 2022-03-20

## 2022-03-17 RX ORDER — AMPICILLIN SODIUM AND SULBACTAM SODIUM 250; 125 MG/ML; MG/ML
3 INJECTION, POWDER, FOR SUSPENSION INTRAMUSCULAR; INTRAVENOUS ONCE
Refills: 0 | Status: COMPLETED | OUTPATIENT
Start: 2022-03-17 | End: 2022-03-17

## 2022-03-17 RX ORDER — AMPICILLIN SODIUM AND SULBACTAM SODIUM 250; 125 MG/ML; MG/ML
3 INJECTION, POWDER, FOR SUSPENSION INTRAMUSCULAR; INTRAVENOUS EVERY 6 HOURS
Refills: 0 | Status: DISCONTINUED | OUTPATIENT
Start: 2022-03-17 | End: 2022-03-20

## 2022-03-17 RX ADMIN — ENZALUTAMIDE 160 MILLIGRAM(S): 80 TABLET ORAL at 11:51

## 2022-03-17 RX ADMIN — ENOXAPARIN SODIUM 40 MILLIGRAM(S): 100 INJECTION SUBCUTANEOUS at 11:44

## 2022-03-17 RX ADMIN — Medication 1 TABLET(S): at 11:43

## 2022-03-17 RX ADMIN — AMPICILLIN SODIUM AND SULBACTAM SODIUM 200 GRAM(S): 250; 125 INJECTION, POWDER, FOR SUSPENSION INTRAMUSCULAR; INTRAVENOUS at 13:16

## 2022-03-17 RX ADMIN — AMPICILLIN SODIUM AND SULBACTAM SODIUM 200 GRAM(S): 250; 125 INJECTION, POWDER, FOR SUSPENSION INTRAMUSCULAR; INTRAVENOUS at 17:18

## 2022-03-17 RX ADMIN — SODIUM CHLORIDE 75 MILLILITER(S): 9 INJECTION INTRAMUSCULAR; INTRAVENOUS; SUBCUTANEOUS at 15:07

## 2022-03-17 RX ADMIN — AMPICILLIN SODIUM AND SULBACTAM SODIUM 200 GRAM(S): 250; 125 INJECTION, POWDER, FOR SUSPENSION INTRAMUSCULAR; INTRAVENOUS at 23:37

## 2022-03-17 RX ADMIN — LISINOPRIL 5 MILLIGRAM(S): 2.5 TABLET ORAL at 11:43

## 2022-03-17 NOTE — PROGRESS NOTE ADULT - ASSESSMENT
76M with PMHx of Prostate cancer ( diagnosed 10 years ago, follows with Dr. Nora Carlson at [facility] [place]) with mets to the bone, hx of RT.,currently on chemotherapy presented to the ED complaining of a 3 month history of a progressively worsening weakness and fatigue associated shortness of breath and dizziness. Patient states he has become profoundly weak- to the point that he struggles with his activities of daily living. Of note, patient has lost approximately 12 pound in the past 3 months. States he was on a clinical trial that abruptly ended approximately 3 months ago and noticed symptoms shortly after. Also with intermittent chills, hot flashes and diarrhea. Patient also with painless lump to right anterior thigh, which has been present for about 3 months. Patient with bloody bowel movements, vomiting, chest pain or palpitations. He does endorse early satiety/ loss of appetite.      Symptomatic  microcytic anemia due to chemotherapy , doubt GI bleed    SOB, chest pain due to severe anemia  with abnormal EKG ruled out ACS and  PE   Metastatic Prostate Cancer  - Hgb 6.1; transfuse 2 units PRBC  - Troponin negative x 2, CP resolved, repeat EKG in am  - Echo pending  - Stool OB negative , check iron studies   - D-Dimer elevated, likely secondary to Prostate cancer  -CTA chest negative for PE  - doppler LE to r/o DVT   - On enzalutamide; wife to bring   -Oncology consult   - PT evaluation    Leukocytosis due to malignancy   - no focal signs of infection noted  - trend cbc  - CT abdo/pelvis in am  - Diarrhea resolved, doubt C.diff    Right thigh mass  -vascular mass involving predominantly the right vastus medialis muscle and encasing the right superficial femoral artery which is focally occluded.  - f/u MRI - pending   -currently without pain/limb weakness    Severe Protein Calorie Malnutrition  - seen by registered dietician    Right lower lobe lung nodule  - Pt aware of nodule; States it has been present for 10 years  - Stopped smoking 6 months ago    HTN  - Continue Lisinopril    DVT PPx: Lovenox    Code status: Full code    DISPO: Trend H&H, f/u MRI, echo, ekg, PT  76M with PMHx of Prostate cancer ( diagnosed 10 years ago, follows with Dr. Nora Carlson at [facility] [place]) with mets to the bone, hx of RT.,currently on chemotherapy presented to the ED complaining of a 3 month history of a progressively worsening weakness and fatigue associated shortness of breath and dizziness. Patient states he has become profoundly weak- to the point that he struggles with his activities of daily living. Of note, patient has lost approximately 12 pound in the past 3 months. States he was on a clinical trial that abruptly ended approximately 3 months ago and noticed symptoms shortly after. Also with intermittent chills, hot flashes and diarrhea. Patient also with painless lump to right anterior thigh, which has been present for about 3 months. Patient with bloody bowel movements, vomiting, chest pain or palpitations. He does endorse early satiety/ loss of appetite.      Symptomatic  microcytic anemia due to chemotherapy, doubt GI bleed    SOB, chest pain due to severe anemia  with abnormal EKG ruled out ACS and  PE   Metastatic Prostate Cancer  - Hgb 6.1; transfuse 2 units PRBC  - Troponin negative x 2, CP resolved, repeat EKG now with 1st degree A- V block, nonspecific ST changes   - Echo- as above LVEF 55-60%, no RWMA   - Stool OB negative x 2 ,  iron studies as above   - D-Dimer elevated, likely secondary to Prostate cancer  -CTA chest negative for PE  - doppler LE to r/o DVT- Nonocclusive thrombus in the right mid femoral vein at and below the level of the mass  - On enzalutamide; wife to bring   -Oncology consult   - PT evaluation      Leukocytosis likely 2/2 colitis  - no focal signs of infection noted  - CT abdo/pelvis as above  - Diarrhea resolved, doubt C.diff  - Start Unasyn     Right thigh soft tissue mass   - MRI demonstrates Soft tissue mass, Scattered T1 hypointense lesions within the proximal femur and visualized portions of the pelvis, compatible with metastatic disease  -vascular mass involving predominantly the right vastus medialis muscle and encasing the right superficial femoral artery which is focally occluded.  -currently without pain/limb weakness  - Cont Lovenox   - Vascular consulted  - IR consulted    Severe Protein Calorie Malnutrition  - seen by registered dietician    Right lower lobe lung nodule  - Pt aware of nodule; States it has been present for 10 years  - Stopped smoking 6 months ago    HTN  - Continue Lisinopril    DVT PPx: Lovenox    Code status: Full code    DISPO: Trend H&H, f/u with vascular sx and IR, oncology to assess need for biopsy

## 2022-03-17 NOTE — PROGRESS NOTE ADULT - ASSESSMENT
75 y/o M diagnosed with [place] 4+5 (9) adenocarcinoma of the prostate metastatic to bone w/ recent clinical rial # with  and enzalutamide in 2019 and was on until 12/31/21 presents for symptomatic anemia.    # anemia- slightly microcytic   -  still anemic - Hb stable at 7.8 no signs of bleeding     # right groin mass  -  RIght groin mass with mass effect on adjacent vascular structures - now found to have occlusive vte   - would recommend biopsy of mass in groin , will need IR Consult prior to biopsy to assess vascularity of mass prior to biopsy especially while on full dose AC    # VTE   - likely secondary to vascular obstruction / compromise from mass effect from proximal thigh mass    # Leukocytosis - likely reactive neutrophil predominant    Saul Hernandes MD   Hematology/ Oncology   [facility]   [facility]   C: [phone]  [address] [place], [place]  P:[phone]  F:[phone]  and [phone]  [address] [place], [place]   P:[phone]  F:[phone]   77 y/o M diagnosed with [place] 4+5 (9) adenocarcinoma of the prostate metastatic to bone w/ recent clinical rial # with  and enzalutamide in 2019 and was on until 12/31/21 presents for symptomatic anemia.    # anemia- slightly microcytic   -  still anemic - Hb stable at 7.8 no signs of bleeding     # right groin mass  -  RIght groin mass with mass effect on adjacent vascular structures - now found to have occlusive vte   - would recommend biopsy of mass in groin , will need IR Consult prior to biopsy to assess vascularity of mass prior to biopsy especially while on full dose AC    # VTE   - likely secondary to vascular obstruction / compromise from mass effect from proximal thigh mass  - would continue with lovenox now on prophylactic dosing-- will reassess prior to possible biopsy     # Leukocytosis - likely reactive neutrophil predominant    Saul Hernandes MD   Hematology/ Oncology   [facility]   [facility]   C: [phone]  [address] [place], [place]  P:[phone]  F:[phone]  and [phone]  [address] [place], [place]   P:[phone]  F:[phone]

## 2022-03-17 NOTE — PROGRESS NOTE ADULT - SUBJECTIVE AND OBJECTIVE BOX
CHIEF COMPLAINT: weakness    HPI: 76M with PMHx of Prostate cancer (diagnosed 10 years ago, follows with Dr. Nora Carlson at [facility] [place]) with mets to the bone, hx of RT; currently on chemotherapy presented to the ED complaining of a 3 month history of a progressively worsening weakness and fatigue associated shortness of breath and dizziness. Patient states he has become profoundly weak- to the point that he struggles with his activities of daily living. Of note, patient has lost approximately 12 pound in the past 3 months. States he was on a clinical trial that abruptly ended approximately 3 months ago and noticed symptoms shortly after. Also with intermittent chills, hot flashes, and diarrhea. Patient also with painless lump to right anterior thigh, which has been present for about 3 months. He does endorse early satiety/ loss of appetite.  In the ED patient with WBC elevated to 21, elevated D-dimer and CTA chest negative for PE.    Interval History: 3/16/22 pt seen and examined at bedside, c/o ongoing weakness and fatigue, becomes dizzy on standing. + exertional SOB, no chest pain or palpitations. Diarrhea has subsided. Denies pain, cough, HA, dysuria, n/v/d, fever, or chills.     REVIEW OF SYSTEMS: All other review of systems is negative unless indicated above.    PHYSICAL EXAM:  Vital Signs Last 24 Hrs: all reviewed  T(C): 37.1 (16 Mar 2022 12:23), Max: 37.8 (15 Mar 2022 20:56)  T(F): 98.7 (16 Mar 2022 12:23), Max: 100 (15 Mar 2022 20:56)  HR: 87 (16 Mar 2022 12:23) (71 - 99)  BP: 153/71 (16 Mar 2022 12:23) (139/62 - 157/72)  RR: 17 (16 Mar 2022 12:23) (17 - 19)  SpO2: 100% (16 Mar 2022 12:23) (99% - 100%) room air    Constitutional: NAD, awake and alert, pale  HEENT: PERR, EOMI  Neck: Soft and supple, No JVD  Respiratory: Breath sounds are clear bilaterally, No wheezing, rales or rhonchi  Cardiovascular: S1 and S2, regular rate and rhythm, no Murmurs  Gastrointestinal: Bowel Sounds present, soft, nontender, nondistended  Extremities: No peripheral edema, painless lump right anterior thigh   Vascular: 2+ peripheral pulses  Neurological: A/O x 3, no focal deficits  Musculoskeletal: 5/5 strength b/l upper and lower extremities  Skin: No rashes    LABS: All Labs Reviewed:                        6.1    18.77 )-----------( 349      ( 16 Mar 2022 06:47 )             21.5     03-16    137  |  108  |  19  ----------------------------<  93  3.9   |  22  |  0.80    Ca    8.5      16 Mar 2022 06:47  Mg     2.4     03-15    TPro  5.7<L>  /  Alb  1.5<L>  /  TBili  0.4  /  DBili  x   /  AST  11<L>  /  ALT  7<L>  /  AlkPhos  167<H>  03-16    PT/INR - ( 16 Mar 2022 06:47 )   PT: 15.5 sec;   INR: 1.33 ratio    PTT - ( 15 Mar 2022 15:05 )  PTT:37.9 sec  (03.16.22 @ 12:05) Occult Blood, Feces: Negative  (03.16.22 @ 06:47) Troponin I, High Sensitivity Result: 10.29  (03.15.22 @ 15:05) Serum Pro-Brain Natriuretic Peptide: 1217 pg/mL   (03.15.22 @ 17:44) Lactate, Blood: 1.3 mmol/L   (03.16.22 @ 06:47) Thyroid Stimulating Hormone, Serum: 1.26 uU/mL     RADIOLOGY/EKG: all reviewed     12 Lead ECG (03.15.22 @ 14:24)   Ventricular Rate 71 BPM  Atrial Rate 71 BPM  P-R Interval 192 ms  QRS Duration 108 ms  Q-T Interval 414 ms  QTC Calculation(Bazett) 449 ms  R Axis -46 degrees  T Axis 75 degrees  Sinus rhythm with Premature atrial complexes  Left anterior fascicular block  T wave abnormality, consider lateral ischemia  Abnormal ECG  < end of copied text >    Xray Chest 1 View- PORTABLE-Urgent (03.15.22 @ 15:09)   FINDINGS:  The cardiac silhouette is normal in size. There are no focal consolidations or pleural effusions. The hilar and mediastinal structures appear unremarkable. The osseous structures are intact.  IMPRESSION: Clear lungs.  < end of copied text >    CT Angio Lower Extremity w/ IV Cont, Right (03.15.22 @ 16:25)   FINDINGS:  The distal right common iliac, external iliac, internal iliac, common femoral, and deep femoral arteries are patent. The proximal superficial femoral artery is patent with calcific plaque. An infiltrative vascular mass involving predominantly the vastus medialis encases the mid superficial femoral artery which is occluded and then reconstituted in the distal thigh. The popliteal artery and posterior tibial trunk are patent. The mass measures approximately 5.2 x 5.9 x 7 cm. (3:65, 612:28)  Diffuse blastic changes are identified in the visualized portions of the right iliac bone, sacrum, and proximal femur.  IMPRESSION:  Vascular mass involving predominantly the right vastus medialis muscle and encasing the right superficial femoral artery which is focally occluded. Recommend further evaluation with MRI.  Local skeletal blastic changes  < end of copied text >    CT Angio Chest PE Protocol w/ IV Cont (03.15.22 @ 16:30)   FINDINGS:  PULMONARY ARTERIES: No pulmonary embolism.  MEDIASTINUM: Normal heart size. Coronary atherosclerosis. No pericardial effusion. Thoracic aorta normal caliber.  No large mediastinal lymph nodes.  AIRWAYS, LUNGS, PLEURA: Tracheal secretions. Emphysema. Right lower lobe superior segment 0.7 cm nodule (image 48, series 2). No consolidation. No pleural effusion.  IMAGED ABDOMEN: Unremarkable.  SOFT TISSUES: Unremarkable.  BONES: Diffuse osseous sclerosis.  IMPRESSION:.  No pulmonary embolism.  Right lower lobe 0.7 cm nodule; recommend CT chest follow-up in 6 months to ensure stability.  < end of copied text >    MEDICATIONS:  MEDICATIONS  (STANDING):  calcium carbonate 1250 mG  + Vitamin D (OsCal 500 + D) 1 Tablet(s) Oral daily  enoxaparin Injectable 40 milliGRAM(s) SubCutaneous every 24 hours  enzalutamide 160 milliGRAM(s) Oral daily  lisinopril 5 milliGRAM(s) Oral daily  multivitamin 1 Tablet(s) Oral daily  sodium chloride 0.9%. 1000 milliLiter(s) (75 mL/Hr) IV Continuous <Continuous>    MEDICATIONS  (PRN):  acetaminophen     Tablet .. 650 milliGRAM(s) Oral every 6 hours PRN Temp greater or equal to 38C (100.4F), Mild Pain (1 - 3)  aluminum hydroxide/magnesium hydroxide/simethicone Suspension 30 milliLiter(s) Oral every 4 hours PRN Dyspepsia  melatonin 3 milliGRAM(s) Oral at bedtime PRN Insomnia  ondansetron Injectable 4 milliGRAM(s) IV Push every 8 hours PRN Nausea and/or Vomiting   CHIEF COMPLAINT: weakness    HPI: 76M with PMHx of Prostate cancer (diagnosed 10 years ago, follows with Dr. Nora Carlson at [facility] [place]) with mets to the bone, hx of RT; currently on chemotherapy presented to the ED complaining of a 3 month history of a progressively worsening weakness and fatigue associated shortness of breath and dizziness. Patient states he has become profoundly weak- to the point that he struggles with his activities of daily living. Of note, patient has lost approximately 12 pound in the past 3 months. States he was on a clinical trial that abruptly ended approximately 3 months ago and noticed symptoms shortly after. Also with intermittent chills, hot flashes, and diarrhea. Patient also with painless lump to right anterior thigh, which has been present for about 3 months. He does endorse early satiety/ loss of appetite.  In the ED patient with WBC elevated to 21, elevated D-dimer and CTA chest negative for PE.    Interval History: 3/17/22 pt seen and examined at bedside, reports improvement in weakness and fatigue following blood transfusion.  No longer dizzy on standing. exertional SOB, resolved. No chest pain or palpitations. Diarrhea has subsided. Denies pain, cough, HA, dysuria, n/v/d, fever, or chills.     REVIEW OF SYSTEMS: All other review of systems is negative unless indicated above.    PHYSICAL EXAM:  Vital Signs Last 24 Hrs: all reviewed        Constitutional: NAD, awake and alert, pale  HEENT: PERR, EOMI  Neck: Soft and supple, No JVD  Respiratory: Breath sounds are clear bilaterally, No wheezing, rales or rhonchi  Cardiovascular: S1 and S2, regular rate and rhythm, no Murmurs  Gastrointestinal: Bowel Sounds present, soft, nontender, nondistended  Extremities: No peripheral edema, painless lump right anterior thigh   Vascular: 2+ peripheral pulses  Neurological: A/O x 3, no focal deficits  Musculoskeletal: 5/5 strength b/l upper and lower extremities  Skin: No rashes    LABS: All Labs Reviewed:                        6.1    18.77 )-----------( 349      ( 16 Mar 2022 06:47 )             21.5     03-16    137  |  108  |  19  ----------------------------<  93  3.9   |  22  |  0.80    Ca    8.5      16 Mar 2022 06:47  Mg     2.4     03-15    TPro  5.7<L>  /  Alb  1.5<L>  /  TBili  0.4  /  DBili  x   /  AST  11<L>  /  ALT  7<L>  /  AlkPhos  167<H>  03-16    PT/INR - ( 16 Mar 2022 06:47 )   PT: 15.5 sec;   INR: 1.33 ratio    PTT - ( 15 Mar 2022 15:05 )  PTT:37.9 sec  (03.16.22 @ 12:05) Occult Blood, Feces: Negative  (03.16.22 @ 06:47) Troponin I, High Sensitivity Result: 10.29  (03.15.22 @ 15:05) Serum Pro-Brain Natriuretic Peptide: 1217 pg/mL   (03.15.22 @ 17:44) Lactate, Blood: 1.3 mmol/L   (03.16.22 @ 06:47) Thyroid Stimulating Hormone, Serum: 1.26 uU/mL     RADIOLOGY/EKG: all reviewed     12 Lead ECG (03.15.22 @ 14:24)   Ventricular Rate 71 BPM  Atrial Rate 71 BPM  P-R Interval 192 ms  QRS Duration 108 ms  Q-T Interval 414 ms  QTC Calculation(Bazett) 449 ms  R Axis -46 degrees  T Axis 75 degrees  Sinus rhythm with Premature atrial complexes  Left anterior fascicular block  T wave abnormality, consider lateral ischemia  Abnormal ECG  < end of copied text >    Xray Chest 1 View- PORTABLE-Urgent (03.15.22 @ 15:09)   FINDINGS:  The cardiac silhouette is normal in size. There are no focal consolidations or pleural effusions. The hilar and mediastinal structures appear unremarkable. The osseous structures are intact.  IMPRESSION: Clear lungs.  < end of copied text >    CT Angio Lower Extremity w/ IV Cont, Right (03.15.22 @ 16:25)   FINDINGS:  The distal right common iliac, external iliac, internal iliac, common femoral, and deep femoral arteries are patent. The proximal superficial femoral artery is patent with calcific plaque. An infiltrative vascular mass involving predominantly the vastus medialis encases the mid superficial femoral artery which is occluded and then reconstituted in the distal thigh. The popliteal artery and posterior tibial trunk are patent. The mass measures approximately 5.2 x 5.9 x 7 cm. (3:65, 612:28)  Diffuse blastic changes are identified in the visualized portions of the right iliac bone, sacrum, and proximal femur.  IMPRESSION:  Vascular mass involving predominantly the right vastus medialis muscle and encasing the right superficial femoral artery which is focally occluded. Recommend further evaluation with MRI.  Local skeletal blastic changes  < end of copied text >    CT Angio Chest PE Protocol w/ IV Cont (03.15.22 @ 16:30)   FINDINGS:  PULMONARY ARTERIES: No pulmonary embolism.  MEDIASTINUM: Normal heart size. Coronary atherosclerosis. No pericardial effusion. Thoracic aorta normal caliber.  No large mediastinal lymph nodes.  AIRWAYS, LUNGS, PLEURA: Tracheal secretions. Emphysema. Right lower lobe superior segment 0.7 cm nodule (image 48, series 2). No consolidation. No pleural effusion.  IMAGED ABDOMEN: Unremarkable.  SOFT TISSUES: Unremarkable.  BONES: Diffuse osseous sclerosis.  IMPRESSION:.  No pulmonary embolism.  Right lower lobe 0.7 cm nodule; recommend CT chest follow-up in 6 months to ensure stability.  < end of copied text >    MEDICATIONS:  MEDICATIONS  (STANDING):  calcium carbonate 1250 mG  + Vitamin D (OsCal 500 + D) 1 Tablet(s) Oral daily  enoxaparin Injectable 40 milliGRAM(s) SubCutaneous every 24 hours  enzalutamide 160 milliGRAM(s) Oral daily  lisinopril 5 milliGRAM(s) Oral daily  multivitamin 1 Tablet(s) Oral daily  sodium chloride 0.9%. 1000 milliLiter(s) (75 mL/Hr) IV Continuous <Continuous>    MEDICATIONS  (PRN):  acetaminophen     Tablet .. 650 milliGRAM(s) Oral every 6 hours PRN Temp greater or equal to 38C (100.4F), Mild Pain (1 - 3)  aluminum hydroxide/magnesium hydroxide/simethicone Suspension 30 milliLiter(s) Oral every 4 hours PRN Dyspepsia  melatonin 3 milliGRAM(s) Oral at bedtime PRN Insomnia  ondansetron Injectable 4 milliGRAM(s) IV Push every 8 hours PRN Nausea and/or Vomiting   CHIEF COMPLAINT: weakness    HPI: 76M with PMHx of Prostate cancer (diagnosed 10 years ago, follows with Dr. Nora Carlson at [facility] [place]) with mets to the bone, hx of RT; currently on chemotherapy presented to the ED complaining of a 3 month history of a progressively worsening weakness and fatigue associated shortness of breath and dizziness. Patient states he has become profoundly weak- to the point that he struggles with his activities of daily living. Of note, patient has lost approximately 12 pound in the past 3 months. States he was on a clinical trial that abruptly ended approximately 3 months ago and noticed symptoms shortly after. Also with intermittent chills, hot flashes, and diarrhea. Patient also with painless lump to right anterior thigh, which has been present for about 3 months. He does endorse early satiety/ loss of appetite.  In the ED patient with WBC elevated to 21, elevated D-dimer and CTA chest negative for PE.    Interval History: 3/17/22 pt seen and examined at bedside, reports improvement in weakness and fatigue following blood transfusion.  No longer dizzy on standing. exertional SOB, resolved. No chest pain or palpitations. Diarrhea has subsided. Denies pain, cough, HA, dysuria, n/v/d, fever, or chills.     REVIEW OF SYSTEMS: All other review of systems is negative unless indicated above.    PHYSICAL EXAM:  Vital Signs Last 24 Hrs: all reviewed  T(C): 36.8 (17 Mar 2022 15:07), Max: 37.1 (16 Mar 2022 22:27)  T(F): 98.2 (17 Mar 2022 15:07), Max: 98.7 (16 Mar 2022 22:27)  HR: 86 (17 Mar 2022 15:07) (61 - 86)  BP: 134/59 (17 Mar 2022 15:07) (134/59 - 176/68)  RR: 18 (17 Mar 2022 15:07) (18 - 18)  SpO2: 99% (17 Mar 2022 15:07) (98% - 100%)    Constitutional: NAD, awake and alert  HEENT: PERR, EOMI  Neck: Soft and supple, No JVD  Respiratory: Breath sounds are clear bilaterally, No wheezing, rales or rhonchi  Cardiovascular: S1 and S2, regular rate and rhythm, no Murmurs  Gastrointestinal: Bowel Sounds present, soft, nontender, nondistended  Extremities: No peripheral edema, painless lump right anterior thigh   Vascular: 2+ peripheral pulses  Neurological: A/O x 3, no focal deficits  Musculoskeletal: 5/5 strength b/l upper and lower extremities  Skin: No rashes    LABS: All Labs Reviewed:                        7.8    22.14 )-----------( 330      ( 17 Mar 2022 07:46 )             26.8   03-17    137  |  107  |  14  ----------------------------<  97  3.6   |  22  |  0.67    Ca    8.9      17 Mar 2022 07:46  Phos  2.2     03-17  Mg     2.0     03-17    TPro  6.2  /  Alb  1.6<L>  /  TBili  0.7  /  DBili  x   /  AST  10<L>  /  ALT  8<L>  /  AlkPhos  168<H>  03-17    PT/INR - ( 16 Mar 2022 06:47 )   PT: 15.5 sec;   INR: 1.33 ratio    PTT - ( 15 Mar 2022 15:05 )  PTT:37.9 sec  (03.17.22 @ 12:00) Occult Blood, Feces: Negative  (03.17.22 @ 07:46) Lactate Dehydrogenase, Serum: 189 U/L   (03.17.22 @ 07:46) Haptoglobin, Serum: 434 mg/dL   (03.17.22 @ 07:46) Vitamin B12, Serum: 1237 pg/mL  (03.17.22 @ 07:46) Ferritin, Serum: 483 ng/mL  (03.16.22 @ 12:05) Occult Blood, Feces: Negative  (03.16.22 @ 06:47) Troponin I, High Sensitivity Result: 10.29  (03.15.22 @ 15:05) Serum Pro-Brain Natriuretic Peptide: 1217 pg/mL   (03.15.22 @ 17:44) Lactate, Blood: 1.3 mmol/L   (03.16.22 @ 06:47) Thyroid Stimulating Hormone, Serum: 1.26 uU/mL     RADIOLOGY/EKG: all reviewed     MR Femur w/wo IV Cont, Right (03.16.22 @ 22:21)   Findings:  Heterogeneously enhancing T1 hypointense, heterogeneously T2 hyperintense   mass within the anteromedial proximal thigh, which involves portions of   the sartorius muscle, the adductor longus muscle, and adductor brevis.   Mass abuts the vastus medialis muscle. Mass measures approximately 6.4 x   6.5 cm transaxially and 6.6 cm cranial to caudal. Lesion envelops the   proximal superficial femoral artery. There is surrounding reactive edema   and enhancement.  Scattered T1 hypointense lesions are seen within the visualized pelvis   and within the proximal, likely related to metastatic disease.   Incompletely evaluated right hip arthrosis.  Nonspecific subcutaneous edema about the thigh.  Impression:  Heterogeneously enhancing soft tissue mass within the anteromedial   proximal thigh, described above, concerning for malignancy. Soft tissue   sampling/histopathologic diagnosis is recommended for further evaluation.  Scattered T1 hypointense lesions within the proximal femur and visualized   portions of the pelvis, compatible with metastatic disease.  < end of copied text >    CT Abdomen and Pelvis w/ Oral Cont and w/ IV Cont (03.17.22 @ 09:50)   FINDINGS:  LOWER CHEST: Small right-sided pleural effusion. No consolidative opacity   in the lung bases. Cardiomegaly and coronary artery disease.  LIVER: Fatty changes along the falciform ligament. No focal liver lesions.  BILE DUCTS: Normal caliber.  GALLBLADDER: Within normal limits.  SPLEEN: Within normal limits.  PANCREAS: Within normal limits.  ADRENALS: Within normal limits.  KIDNEYS/URETERS: Subcentimetercortical hypodensity in the left kidney   lower pole, too small to characterize. Otherwise normal appearance of   bilateral kidneys. No hydronephrosis or hydroureter.  BLADDER: Slightly elevated attenuation values of the urine in the urinary   bladder. Correlate with urinalysis for hematuria or infection.  REPRODUCTIVE ORGANS: Unchanged metallic foci in the prostate, correlate   with prior intervention.  BOWEL: Significant pancolonic diverticulosis throughout the colon without   signs of acute inflammation. Colon is mostly collapsed. There is   suggestion of edematous wall thickening involving the ascending colon and   to a lesser degree the right transverse colon with surrounding fat   stranding. Numerous small bowel diverticula are identified. There is   displacement of small bowel loops which are mildly dilated into the left   upper quadrant which may be secondary to adhesions. Incomplete swirling   of the mesenteric axis however less pronounced than on prior study dated   12/24/2020 and similar to post operative study 12/25/2020. No definite   small bowel obstruction. Appendix is not well visualized.  PERITONEUM: No ascites.  VESSELS: Abdominal aorta with atherosclerotic changes measures 2.9 cm   right common iliac aneurysm measuring 2.3 cm.Swirling of the mesenteric   axis similar compared to postoperative scan 12/25/2020 with widely patent   SMA and SMV. Small focal caliber change of branches of the SMV on image   2-52 as on prior study. Hepatic veins, portal vein and IVC are patent.  RETROPERITONEUM/LYMPH NODES: No lymphadenopathy.  ABDOMINAL WALL: Mild anasarca.  BONES: Extensive sclerotic disease involving the entire visualized   skeleton without significant changes compared to prior.  IMPRESSION:  Diffuse edematous wall thickening of the ascending and right transverse   colon with surrounding fat stranding concerning for colitis.  Pancolonic diverticulosis and small bowel diverticulosis without acute   inflammation of the individual diverticula.  No bowel obstruction; persistent incomplete swirling of the mesentery   axis with widely patent SMA and SMV as described and unchanged when   compared to prior study 12/25/2020.  Increased attenuation of the urine in the bladder, correlate with   urinalysis for hematuria and/or infection.  < end of copied text >    US Duplex Venous Lower Ext Complete, Bilateral (03.17.22 @ 10:35)   FINDINGS:  RIGHT:  Normal compressibility of the RIGHT common femoral, proximal and distal   femoral and popliteal veins. There is nonocclusive thrombus in the right   mid femoral vein.  Doppler examination shows normal spontaneous and phasic flow in the   patent segments.  No RIGHT calf vein thrombosis is detected.  4.1 cm popliteal cyst.  LEFT:  Normal compressibility of the LEFTcommon femoral, femoral and popliteal   veins.  Doppler examination shows normal spontaneous and phasic flow.  No LEFT calf vein thrombosis is detected.  3.2 cm popliteal cyst.  IMPRESSION:  Nonocclusive thrombus in the right mid femoral vein at and below the   level of the mass.  No evidence of deep venous thrombosis in the left lower extremity.  < end of copied text >    12 Lead ECG (03.16.22 @ 10:20)   Ventricular Rate 87 BPM  Atrial Rate 87 BPM  P-R Interval 296 ms  QRS Duration 108 ms  Q-T Interval 392 ms  QTC Calculation(Bazett) 471 ms  R Axis -65 degrees  T Axis 75 degrees  Sinus rhythm with sinus arrhythmia with 1st degree A-V block with occasional Premature ventricular complexes  Left anterior fascicular block  Nonspecific ST abnormality  Abnormal ECG  < end of copied text >    TTE Echo Complete w/o Contrast w/ Doppler (03.17.22 @ 11:13)   Impression  Summary  Fibrocalcific changes noted to the mitral valve leaflets with preserved leaflet excursion.   Mild mitral annular calcification is present.   Mild (1+) mitral regurgitation is present.   Mild aortic sclerosis is present with restricted valvular opening.   Transaortic gradients are elevated but do not meet the criteria for   aortic   stenosis.   The aortic root is at the upper limits of normal in size.   The tricuspid valve leaflets are thin and pliable; valve motion is   normal.   Trace to mild tricuspid valve regurgitation is present.   Mild pulmonary hypertension.   The left atrium is mildly dilated.   Mild concentric left ventricular hypertrophy is present.   Estimated left ventricular ejection fraction is 55-60 %.   An interatrial septal aneurysm is noted.   The IVC is mildly dilated.  < end of copied text >    12 Lead ECG (03.15.22 @ 14:24)   Ventricular Rate 71 BPM  Atrial Rate 71 BPM  P-R Interval 192 ms  QRS Duration 108 ms  Q-T Interval 414 ms  QTC Calculation(Bazett) 449 ms  R Axis -46 degrees  T Axis 75 degrees  Sinus rhythm with Premature atrial complexes  Left anterior fascicular block  T wave abnormality, consider lateral ischemia  Abnormal ECG  < end of copied text >    Xray Chest 1 View- PORTABLE-Urgent (03.15.22 @ 15:09)   FINDINGS:  The cardiac silhouette is normal in size. There are no focal consolidations or pleural effusions. The hilar and mediastinal structures appear unremarkable. The osseous structures are intact.  IMPRESSION: Clear lungs.  < end of copied text >    CT Angio Lower Extremity w/ IV Cont, Right (03.15.22 @ 16:25)   FINDINGS:  The distal right common iliac, external iliac, internal iliac, common femoral, and deep femoral arteries are patent. The proximal superficial femoral artery is patent with calcific plaque. An infiltrative vascular mass involving predominantly the vastus medialis encases the mid superficial femoral artery which is occluded and then reconstituted in the distal thigh. The popliteal artery and posterior tibial trunk are patent. The mass measures approximately 5.2 x 5.9 x 7 cm. (3:65, 612:28)  Diffuse blastic changes are identified in the visualized portions of the right iliac bone, sacrum, and proximal femur.  IMPRESSION:  Vascular mass involving predominantly the right vastus medialis muscle and encasing the right superficial femoral artery which is focally occluded. Recommend further evaluation with MRI.  Local skeletal blastic changes  < end of copied text >    CT Angio Chest PE Protocol w/ IV Cont (03.15.22 @ 16:30)   FINDINGS:  PULMONARY ARTERIES: No pulmonary embolism.  MEDIASTINUM: Normal heart size. Coronary atherosclerosis. No pericardial effusion. Thoracic aorta normal caliber.  No large mediastinal lymph nodes.  AIRWAYS, LUNGS, PLEURA: Tracheal secretions. Emphysema. Right lower lobe superior segment 0.7 cm nodule (image 48, series 2). No consolidation. No pleural effusion.  IMAGED ABDOMEN: Unremarkable.  SOFT TISSUES: Unremarkable.  BONES: Diffuse osseous sclerosis.  IMPRESSION:.  No pulmonary embolism.  Right lower lobe 0.7 cm nodule; recommend CT chest follow-up in 6 months to ensure stability.  < end of copied text >    MEDICATIONS:  MEDICATIONS  (STANDING):  ampicillin/sulbactam  IVPB      ampicillin/sulbactam  IVPB 3 Gram(s) IV Intermittent every 6 hours  calcium carbonate 1250 mG  + Vitamin D (OsCal 500 + D) 1 Tablet(s) Oral daily  enoxaparin Injectable 40 milliGRAM(s) SubCutaneous every 24 hours  enzalutamide 160 milliGRAM(s) Oral daily  lisinopril 5 milliGRAM(s) Oral daily  multivitamin 1 Tablet(s) Oral daily  sodium chloride 0.9%. 1000 milliLiter(s) (75 mL/Hr) IV Continuous <Continuous>    MEDICATIONS  (PRN):  acetaminophen     Tablet .. 650 milliGRAM(s) Oral every 6 hours PRN Temp greater or equal to 38C (100.4F), Mild Pain (1 - 3)  aluminum hydroxide/magnesium hydroxide/simethicone Suspension 30 milliLiter(s) Oral every 4 hours PRN Dyspepsia  melatonin 3 milliGRAM(s) Oral at bedtime PRN Insomnia  ondansetron Injectable 4 milliGRAM(s) IV Push every 8 hours PRN Nausea and/or Vomiting     CHIEF COMPLAINT: weakness    HPI: 76M with PMHx of Prostate cancer (diagnosed 10 years ago, follows with Dr. Nora Carlson at [facility] [place]) with mets to the bone, hx of RT; currently on chemotherapy presented to the ED complaining of a 3 month history of a progressively worsening weakness and fatigue associated shortness of breath and dizziness. Patient states he has become profoundly weak- to the point that he struggles with his activities of daily living. Of note, patient has lost approximately 12 pound in the past 3 months. States he was on a clinical trial that abruptly ended approximately 3 months ago and noticed symptoms shortly after. Also with intermittent chills, hot flashes, and diarrhea. Patient also with painless lump to right anterior thigh, which has been present for about 3 months. He does endorse early satiety/ loss of appetite.  In the ED patient with WBC elevated to 21, elevated D-dimer and CTA chest negative for PE.    Interval History: 3/17/22 pt seen and examined at bedside, reports improvement in weakness and fatigue following blood transfusion.  No longer dizzy on standing. exertional SOB, resolved. No chest pain or palpitations. Diarrhea has subsided. Denies pain, cough, HA, dysuria, n/v/d, fever, or chills.     REVIEW OF SYSTEMS: All other review of systems is negative unless indicated above.    PHYSICAL EXAM:  Vital Signs Last 24 Hrs: all reviewed  T(C): 36.8 (17 Mar 2022 15:07), Max: 37.1 (16 Mar 2022 22:27)  T(F): 98.2 (17 Mar 2022 15:07), Max: 98.7 (16 Mar 2022 22:27)  HR: 86 (17 Mar 2022 15:07) (61 - 86)  BP: 134/59 (17 Mar 2022 15:07) (134/59 - 176/68)  RR: 18 (17 Mar 2022 15:07) (18 - 18)  SpO2: 99% (17 Mar 2022 15:07) (98% - 100%)  Constitutional: NAD, awake and alert  HEENT: PERR, EOMI  Neck: Soft and supple, No JVD  Respiratory: Breath sounds are clear bilaterally, No wheezing, rales or rhonchi  Cardiovascular: S1 and S2, regular rate and rhythm, no Murmurs  Gastrointestinal: Bowel Sounds present, soft, nontender, nondistended  Extremities: No peripheral edema, painless lump right anterior thigh   Vascular: 2+ peripheral pulses  Neurological: A/O x 3, no focal deficits  Musculoskeletal: 5/5 strength b/l upper and lower extremities  Skin: No rashes    LABS: All Labs Reviewed:                        7.8    22.14 )-----------( 330      ( 17 Mar 2022 07:46 )             26.8   03-17    137  |  107  |  14  ----------------------------<  97  3.6   |  22  |  0.67    Ca    8.9      17 Mar 2022 07:46  Phos  2.2     03-17  Mg     2.0     03-17    TPro  6.2  /  Alb  1.6<L>  /  TBili  0.7  /  DBili  x   /  AST  10<L>  /  ALT  8<L>  /  AlkPhos  168<H>  03-17    PT/INR - ( 16 Mar 2022 06:47 )   PT: 15.5 sec;   INR: 1.33 ratio    PTT - ( 15 Mar 2022 15:05 )  PTT:37.9 sec  (03.17.22 @ 12:00) Occult Blood, Feces: Negative  (03.17.22 @ 07:46) Lactate Dehydrogenase, Serum: 189 U/L   (03.17.22 @ 07:46) Haptoglobin, Serum: 434 mg/dL   (03.17.22 @ 07:46) Vitamin B12, Serum: 1237 pg/mL  (03.17.22 @ 07:46) Ferritin, Serum: 483 ng/mL  (03.16.22 @ 12:05) Occult Blood, Feces: Negative  (03.16.22 @ 06:47) Troponin I, High Sensitivity Result: 10.29  (03.15.22 @ 15:05) Serum Pro-Brain Natriuretic Peptide: 1217 pg/mL   (03.15.22 @ 17:44) Lactate, Blood: 1.3 mmol/L   (03.16.22 @ 06:47) Thyroid Stimulating Hormone, Serum: 1.26 uU/mL     RADIOLOGY/EKG: all reviewed     MR Femur w/wo IV Cont, Right (03.16.22 @ 22:21)   Findings:  Heterogeneously enhancing T1 hypointense, heterogeneously T2 hyperintense   mass within the anteromedial proximal thigh, which involves portions of   the sartorius muscle, the adductor longus muscle, and adductor brevis.   Mass abuts the vastus medialis muscle. Mass measures approximately 6.4 x   6.5 cm transaxially and 6.6 cm cranial to caudal. Lesion envelops the   proximal superficial femoral artery. There is surrounding reactive edema   and enhancement.  Scattered T1 hypointense lesions are seen within the visualized pelvis   and within the proximal, likely related to metastatic disease.   Incompletely evaluated right hip arthrosis.  Nonspecific subcutaneous edema about the thigh.  Impression:  Heterogeneously enhancing soft tissue mass within the anteromedial   proximal thigh, described above, concerning for malignancy. Soft tissue   sampling/histopathologic diagnosis is recommended for further evaluation.  Scattered T1 hypointense lesions within the proximal femur and visualized   portions of the pelvis, compatible with metastatic disease.  < end of copied text >    CT Abdomen and Pelvis w/ Oral Cont and w/ IV Cont (03.17.22 @ 09:50)   FINDINGS:  LOWER CHEST: Small right-sided pleural effusion. No consolidative opacity   in the lung bases. Cardiomegaly and coronary artery disease.  LIVER: Fatty changes along the falciform ligament. No focal liver lesions.  BILE DUCTS: Normal caliber.  GALLBLADDER: Within normal limits.  SPLEEN: Within normal limits.  PANCREAS: Within normal limits.  ADRENALS: Within normal limits.  KIDNEYS/URETERS: Subcentimetercortical hypodensity in the left kidney   lower pole, too small to characterize. Otherwise normal appearance of   bilateral kidneys. No hydronephrosis or hydroureter.  BLADDER: Slightly elevated attenuation values of the urine in the urinary   bladder. Correlate with urinalysis for hematuria or infection.  REPRODUCTIVE ORGANS: Unchanged metallic foci in the prostate, correlate   with prior intervention.  BOWEL: Significant pancolonic diverticulosis throughout the colon without   signs of acute inflammation. Colon is mostly collapsed. There is   suggestion of edematous wall thickening involving the ascending colon and   to a lesser degree the right transverse colon with surrounding fat   stranding. Numerous small bowel diverticula are identified. There is   displacement of small bowel loops which are mildly dilated into the left   upper quadrant which may be secondary to adhesions. Incomplete swirling   of the mesenteric axis however less pronounced than on prior study dated   12/24/2020 and similar to post operative study 12/25/2020. No definite   small bowel obstruction. Appendix is not well visualized.  PERITONEUM: No ascites.  VESSELS: Abdominal aorta with atherosclerotic changes measures 2.9 cm   right common iliac aneurysm measuring 2.3 cm.Swirling of the mesenteric   axis similar compared to postoperative scan 12/25/2020 with widely patent   SMA and SMV. Small focal caliber change of branches of the SMV on image   2-52 as on prior study. Hepatic veins, portal vein and IVC are patent.  RETROPERITONEUM/LYMPH NODES: No lymphadenopathy.  ABDOMINAL WALL: Mild anasarca.  BONES: Extensive sclerotic disease involving the entire visualized   skeleton without significant changes compared to prior.  IMPRESSION:  Diffuse edematous wall thickening of the ascending and right transverse   colon with surrounding fat stranding concerning for colitis.  Pancolonic diverticulosis and small bowel diverticulosis without acute   inflammation of the individual diverticula.  No bowel obstruction; persistent incomplete swirling of the mesentery   axis with widely patent SMA and SMV as described and unchanged when   compared to prior study 12/25/2020.  Increased attenuation of the urine in the bladder, correlate with   urinalysis for hematuria and/or infection.  < end of copied text >    US Duplex Venous Lower Ext Complete, Bilateral (03.17.22 @ 10:35)   FINDINGS:  RIGHT:  Normal compressibility of the RIGHT common femoral, proximal and distal   femoral and popliteal veins. There is nonocclusive thrombus in the right   mid femoral vein.  Doppler examination shows normal spontaneous and phasic flow in the   patent segments.  No RIGHT calf vein thrombosis is detected.  4.1 cm popliteal cyst.  LEFT:  Normal compressibility of the LEFTcommon femoral, femoral and popliteal   veins.  Doppler examination shows normal spontaneous and phasic flow.  No LEFT calf vein thrombosis is detected.  3.2 cm popliteal cyst.  IMPRESSION:  Nonocclusive thrombus in the right mid femoral vein at and below the   level of the mass.  No evidence of deep venous thrombosis in the left lower extremity.  < end of copied text >    12 Lead ECG (03.16.22 @ 10:20)   Ventricular Rate 87 BPM  Atrial Rate 87 BPM  P-R Interval 296 ms  QRS Duration 108 ms  Q-T Interval 392 ms  QTC Calculation(Bazett) 471 ms  R Axis -65 degrees  T Axis 75 degrees  Sinus rhythm with sinus arrhythmia with 1st degree A-V block with occasional Premature ventricular complexes  Left anterior fascicular block  Nonspecific ST abnormality  Abnormal ECG  < end of copied text >    TTE Echo Complete w/o Contrast w/ Doppler (03.17.22 @ 11:13)   Impression  Summary  Fibrocalcific changes noted to the mitral valve leaflets with preserved leaflet excursion.   Mild mitral annular calcification is present.   Mild (1+) mitral regurgitation is present.   Mild aortic sclerosis is present with restricted valvular opening.   Transaortic gradients are elevated but do not meet the criteria for   aortic   stenosis.   The aortic root is at the upper limits of normal in size.   The tricuspid valve leaflets are thin and pliable; valve motion is   normal.   Trace to mild tricuspid valve regurgitation is present.   Mild pulmonary hypertension.   The left atrium is mildly dilated.   Mild concentric left ventricular hypertrophy is present.   Estimated left ventricular ejection fraction is 55-60 %.   An interatrial septal aneurysm is noted.   The IVC is mildly dilated.  < end of copied text >    12 Lead ECG (03.15.22 @ 14:24)   Ventricular Rate 71 BPM  Atrial Rate 71 BPM  P-R Interval 192 ms  QRS Duration 108 ms  Q-T Interval 414 ms  QTC Calculation(Bazett) 449 ms  R Axis -46 degrees  T Axis 75 degrees  Sinus rhythm with Premature atrial complexes  Left anterior fascicular block  T wave abnormality, consider lateral ischemia  Abnormal ECG  < end of copied text >    Xray Chest 1 View- PORTABLE-Urgent (03.15.22 @ 15:09)   FINDINGS:  The cardiac silhouette is normal in size. There are no focal consolidations or pleural effusions. The hilar and mediastinal structures appear unremarkable. The osseous structures are intact.  IMPRESSION: Clear lungs.  < end of copied text >    CT Angio Lower Extremity w/ IV Cont, Right (03.15.22 @ 16:25)   FINDINGS:  The distal right common iliac, external iliac, internal iliac, common femoral, and deep femoral arteries are patent. The proximal superficial femoral artery is patent with calcific plaque. An infiltrative vascular mass involving predominantly the vastus medialis encases the mid superficial femoral artery which is occluded and then reconstituted in the distal thigh. The popliteal artery and posterior tibial trunk are patent. The mass measures approximately 5.2 x 5.9 x 7 cm. (3:65, 612:28)  Diffuse blastic changes are identified in the visualized portions of the right iliac bone, sacrum, and proximal femur.  IMPRESSION:  Vascular mass involving predominantly the right vastus medialis muscle and encasing the right superficial femoral artery which is focally occluded. Recommend further evaluation with MRI.  Local skeletal blastic changes  < end of copied text >    CT Angio Chest PE Protocol w/ IV Cont (03.15.22 @ 16:30)   FINDINGS:  PULMONARY ARTERIES: No pulmonary embolism.  MEDIASTINUM: Normal heart size. Coronary atherosclerosis. No pericardial effusion. Thoracic aorta normal caliber.  No large mediastinal lymph nodes.  AIRWAYS, LUNGS, PLEURA: Tracheal secretions. Emphysema. Right lower lobe superior segment 0.7 cm nodule (image 48, series 2). No consolidation. No pleural effusion.  IMAGED ABDOMEN: Unremarkable.  SOFT TISSUES: Unremarkable.  BONES: Diffuse osseous sclerosis.  IMPRESSION:.  No pulmonary embolism.  Right lower lobe 0.7 cm nodule; recommend CT chest follow-up in 6 months to ensure stability.  < end of copied text >    MEDICATIONS:  MEDICATIONS  (STANDING):  ampicillin/sulbactam  IVPB      ampicillin/sulbactam  IVPB 3 Gram(s) IV Intermittent every 6 hours  calcium carbonate 1250 mG  + Vitamin D (OsCal 500 + D) 1 Tablet(s) Oral daily  enoxaparin Injectable 40 milliGRAM(s) SubCutaneous every 24 hours  enzalutamide 160 milliGRAM(s) Oral daily  lisinopril 5 milliGRAM(s) Oral daily  multivitamin 1 Tablet(s) Oral daily  sodium chloride 0.9%. 1000 milliLiter(s) (75 mL/Hr) IV Continuous <Continuous>    MEDICATIONS  (PRN):  acetaminophen     Tablet .. 650 milliGRAM(s) Oral every 6 hours PRN Temp greater or equal to 38C (100.4F), Mild Pain (1 - 3)  aluminum hydroxide/magnesium hydroxide/simethicone Suspension 30 milliLiter(s) Oral every 4 hours PRN Dyspepsia  melatonin 3 milliGRAM(s) Oral at bedtime PRN Insomnia  ondansetron Injectable 4 milliGRAM(s) IV Push every 8 hours PRN Nausea and/or Vomiting

## 2022-03-17 NOTE — PROGRESS NOTE ADULT - SUBJECTIVE AND OBJECTIVE BOX
INTERVAL HPI/OVERNIGHT EVENTS:  Patient S&E at bedside. No o/n events, patient resting comfortably.   VITAL SIGNS:  T(F): 98.2 (03-17-22 @ 15:07)  HR: 86 (03-17-22 @ 15:07)  BP: 134/59 (03-17-22 @ 15:07)  RR: 18 (03-17-22 @ 15:07)  SpO2: 99% (03-17-22 @ 15:07)  Wt(kg): --    PHYSICAL EXAM:    Constitutional: NAD  Eyes: EOMI, sclera non-icteric  Neck: supple, no masses, no JVD  Respiratory: CTA b/l, good air entry b/l  Cardiovascular: RRR, no M/R/G  Gastrointestinal: soft, NTND, no masses palpable, + BS, no hepatosplenomegaly  Extremities: RIGHT lower extremity large palpable mass, nontender non pulsatile  Neurological: AAOx3      MEDICATIONS  (STANDING):  ampicillin/sulbactam  IVPB      ampicillin/sulbactam  IVPB 3 Gram(s) IV Intermittent every 6 hours  calcium carbonate 1250 mG  + Vitamin D (OsCal 500 + D) 1 Tablet(s) Oral daily  enoxaparin Injectable 40 milliGRAM(s) SubCutaneous every 24 hours  enzalutamide 160 milliGRAM(s) Oral daily  lisinopril 5 milliGRAM(s) Oral daily  multivitamin 1 Tablet(s) Oral daily  sodium chloride 0.9%. 1000 milliLiter(s) (75 mL/Hr) IV Continuous <Continuous>    MEDICATIONS  (PRN):  acetaminophen     Tablet .. 650 milliGRAM(s) Oral every 6 hours PRN Temp greater or equal to 38C (100.4F), Mild Pain (1 - 3)  aluminum hydroxide/magnesium hydroxide/simethicone Suspension 30 milliLiter(s) Oral every 4 hours PRN Dyspepsia  melatonin 3 milliGRAM(s) Oral at bedtime PRN Insomnia  ondansetron Injectable 4 milliGRAM(s) IV Push every 8 hours PRN Nausea and/or Vomiting      Allergies    No Known Allergies    Intolerances        LABS:                        7.8    22.14 )-----------( 330      ( 17 Mar 2022 07:46 )             26.8     03-17    137  |  107  |  14  ----------------------------<  97  3.6   |  22  |  0.67    Ca    8.9      17 Mar 2022 07:46  Phos  2.2     03-17  Mg     2.0     03-17    TPro  6.2  /  Alb  1.6<L>  /  TBili  0.7  /  DBili  x   /  AST  10<L>  /  ALT  8<L>  /  AlkPhos  168<H>  03-17    PT/INR - ( 16 Mar 2022 06:47 )   PT: 15.5 sec;   INR: 1.33 ratio               RADIOLOGY & ADDITIONAL TESTS:  Studies reviewed.    ASSESSMENT & PLAN:

## 2022-03-18 LAB
ALBUMIN SERPL ELPH-MCNC: 1.3 G/DL — LOW (ref 3.3–5)
ALP SERPL-CCNC: 136 U/L — HIGH (ref 40–120)
ALT FLD-CCNC: 7 U/L — LOW (ref 12–78)
ANION GAP SERPL CALC-SCNC: 5 MMOL/L — SIGNIFICANT CHANGE UP (ref 5–17)
APTT BLD: 33.9 SEC — SIGNIFICANT CHANGE UP (ref 27.5–35.5)
AST SERPL-CCNC: 8 U/L — LOW (ref 15–37)
BILIRUB SERPL-MCNC: 0.5 MG/DL — SIGNIFICANT CHANGE UP (ref 0.2–1.2)
BUN SERPL-MCNC: 14 MG/DL — SIGNIFICANT CHANGE UP (ref 7–23)
CALCIUM SERPL-MCNC: 8.4 MG/DL — LOW (ref 8.5–10.1)
CHLORIDE SERPL-SCNC: 107 MMOL/L — SIGNIFICANT CHANGE UP (ref 96–108)
CO2 SERPL-SCNC: 25 MMOL/L — SIGNIFICANT CHANGE UP (ref 22–31)
CREAT SERPL-MCNC: 0.67 MG/DL — SIGNIFICANT CHANGE UP (ref 0.5–1.3)
EGFR: 97 ML/MIN/1.73M2 — SIGNIFICANT CHANGE UP
GLUCOSE SERPL-MCNC: 106 MG/DL — HIGH (ref 70–99)
HCT VFR BLD CALC: 23.7 % — LOW (ref 39–50)
HGB BLD-MCNC: 7 G/DL — CRITICAL LOW (ref 13–17)
INR BLD: 1.32 RATIO — HIGH (ref 0.88–1.16)
MAGNESIUM SERPL-MCNC: 2.1 MG/DL — SIGNIFICANT CHANGE UP (ref 1.6–2.6)
MCHC RBC-ENTMCNC: 24.1 PG — LOW (ref 27–34)
MCHC RBC-ENTMCNC: 29.5 GM/DL — LOW (ref 32–36)
MCV RBC AUTO: 81.4 FL — SIGNIFICANT CHANGE UP (ref 80–100)
PHOSPHATE SERPL-MCNC: 2.6 MG/DL — SIGNIFICANT CHANGE UP (ref 2.5–4.5)
PLATELET # BLD AUTO: 258 K/UL — SIGNIFICANT CHANGE UP (ref 150–400)
POTASSIUM SERPL-MCNC: 3.7 MMOL/L — SIGNIFICANT CHANGE UP (ref 3.5–5.3)
POTASSIUM SERPL-SCNC: 3.7 MMOL/L — SIGNIFICANT CHANGE UP (ref 3.5–5.3)
PROT SERPL-MCNC: 5.1 GM/DL — LOW (ref 6–8.3)
PROTHROM AB SERPL-ACNC: 15.3 SEC — HIGH (ref 10.5–13.4)
RBC # BLD: 2.91 M/UL — LOW (ref 4.2–5.8)
RBC # FLD: 18.4 % — HIGH (ref 10.3–14.5)
SODIUM SERPL-SCNC: 137 MMOL/L — SIGNIFICANT CHANGE UP (ref 135–145)
WBC # BLD: 16.67 K/UL — HIGH (ref 3.8–10.5)
WBC # FLD AUTO: 16.67 K/UL — HIGH (ref 3.8–10.5)

## 2022-03-18 PROCEDURE — 73552 X-RAY EXAM OF FEMUR 2/>: CPT | Mod: 26,RT

## 2022-03-18 PROCEDURE — 99232 SBSQ HOSP IP/OBS MODERATE 35: CPT | Mod: GC

## 2022-03-18 PROCEDURE — 99232 SBSQ HOSP IP/OBS MODERATE 35: CPT

## 2022-03-18 PROCEDURE — 73502 X-RAY EXAM HIP UNI 2-3 VIEWS: CPT | Mod: 26,RT

## 2022-03-18 RX ORDER — ACETAMINOPHEN 500 MG
650 TABLET ORAL ONCE
Refills: 0 | Status: COMPLETED | OUTPATIENT
Start: 2022-03-18 | End: 2022-03-18

## 2022-03-18 RX ORDER — FUROSEMIDE 40 MG
20 TABLET ORAL ONCE
Refills: 0 | Status: COMPLETED | OUTPATIENT
Start: 2022-03-18 | End: 2022-03-18

## 2022-03-18 RX ADMIN — SODIUM CHLORIDE 75 MILLILITER(S): 9 INJECTION INTRAMUSCULAR; INTRAVENOUS; SUBCUTANEOUS at 05:33

## 2022-03-18 RX ADMIN — SODIUM CHLORIDE 75 MILLILITER(S): 9 INJECTION INTRAMUSCULAR; INTRAVENOUS; SUBCUTANEOUS at 20:31

## 2022-03-18 RX ADMIN — LISINOPRIL 5 MILLIGRAM(S): 2.5 TABLET ORAL at 11:18

## 2022-03-18 RX ADMIN — AMPICILLIN SODIUM AND SULBACTAM SODIUM 200 GRAM(S): 250; 125 INJECTION, POWDER, FOR SUSPENSION INTRAMUSCULAR; INTRAVENOUS at 05:33

## 2022-03-18 RX ADMIN — Medication 20 MILLIGRAM(S): at 16:00

## 2022-03-18 RX ADMIN — ENZALUTAMIDE 160 MILLIGRAM(S): 80 TABLET ORAL at 11:54

## 2022-03-18 RX ADMIN — ENOXAPARIN SODIUM 40 MILLIGRAM(S): 100 INJECTION SUBCUTANEOUS at 11:19

## 2022-03-18 RX ADMIN — Medication 650 MILLIGRAM(S): at 16:30

## 2022-03-18 RX ADMIN — AMPICILLIN SODIUM AND SULBACTAM SODIUM 200 GRAM(S): 250; 125 INJECTION, POWDER, FOR SUSPENSION INTRAMUSCULAR; INTRAVENOUS at 18:23

## 2022-03-18 RX ADMIN — Medication 650 MILLIGRAM(S): at 16:01

## 2022-03-18 RX ADMIN — Medication 1 TABLET(S): at 11:17

## 2022-03-18 RX ADMIN — AMPICILLIN SODIUM AND SULBACTAM SODIUM 200 GRAM(S): 250; 125 INJECTION, POWDER, FOR SUSPENSION INTRAMUSCULAR; INTRAVENOUS at 23:08

## 2022-03-18 RX ADMIN — AMPICILLIN SODIUM AND SULBACTAM SODIUM 200 GRAM(S): 250; 125 INJECTION, POWDER, FOR SUSPENSION INTRAMUSCULAR; INTRAVENOUS at 11:54

## 2022-03-18 RX ADMIN — Medication 1 TABLET(S): at 11:18

## 2022-03-18 NOTE — PROVIDER CONTACT NOTE (CRITICAL VALUE NOTIFICATION) - PERSON GIVING RESULT:
serge/ ;ab
[facility]

## 2022-03-18 NOTE — PROGRESS NOTE ADULT - SUBJECTIVE AND OBJECTIVE BOX
INTERVAL HPI/OVERNIGHT EVENTS:  Patient S&E at bedside. Overnight, pt reports sleeping well, denies any dizziness, or pain. VSS overnight   Labs reviewed, WBC 16, Hb 7 today, plt 258k. FOBT repeat was negative. IR evaluation pending for biopsy. Remains on lovenox ppx dosing.        PAST MEDICAL & SURGICAL HISTORY:  Prostate CA    HTN (hypertension)    Diverticulitis    No significant past surgical history        FAMILY HISTORY:  No pertinent family history in first degree relatives        VITAL SIGNS:  T(F): 98.5 (03-18-22 @ 05:54)  HR: 66 (03-17-22 @ 20:47)  BP: 133/47 (03-17-22 @ 20:47)  RR: 16 (03-17-22 @ 20:47)  SpO2: 98% (03-17-22 @ 20:47)  Wt(kg): --    PHYSICAL EXAM:    Constitutional: NAD  Eyes: EOMI, sclera non-icteric  Neck: supple, no masses, no JVD  Respiratory: CTA b/l, good air entry b/l  Cardiovascular: RRR, no M/R/G  Gastrointestinal: soft, NTND, no masses palpable, + BS, no hepatosplenomegaly  Extremities: RIGHT lower extremity large palpable mass, nontender non pulsatile  Neurological: AAOx3      MEDICATIONS  (STANDING):  acetaminophen     Tablet .. 650 milliGRAM(s) Oral once  ampicillin/sulbactam  IVPB      ampicillin/sulbactam  IVPB 3 Gram(s) IV Intermittent every 6 hours  calcium carbonate 1250 mG  + Vitamin D (OsCal 500 + D) 1 Tablet(s) Oral daily  enoxaparin Injectable 40 milliGRAM(s) SubCutaneous every 24 hours  enzalutamide 160 milliGRAM(s) Oral daily  furosemide   Injectable 20 milliGRAM(s) IV Push once  lisinopril 5 milliGRAM(s) Oral daily  multivitamin 1 Tablet(s) Oral daily  sodium chloride 0.9%. 1000 milliLiter(s) (75 mL/Hr) IV Continuous <Continuous>    MEDICATIONS  (PRN):  acetaminophen     Tablet .. 650 milliGRAM(s) Oral every 6 hours PRN Temp greater or equal to 38C (100.4F), Mild Pain (1 - 3)  aluminum hydroxide/magnesium hydroxide/simethicone Suspension 30 milliLiter(s) Oral every 4 hours PRN Dyspepsia  melatonin 3 milliGRAM(s) Oral at bedtime PRN Insomnia  ondansetron Injectable 4 milliGRAM(s) IV Push every 8 hours PRN Nausea and/or Vomiting      Allergies    No Known Allergies    Intolerances        LABS:                        7.0    16.67 )-----------( 258      ( 18 Mar 2022 07:17 )             23.7     03-18    137  |  107  |  14  ----------------------------<  106<H>  3.7   |  25  |  0.67    Ca    8.4<L>      18 Mar 2022 07:17  Phos  2.6     03-18  Mg     2.1     03-18    TPro  5.1<L>  /  Alb  1.3<L>  /  TBili  0.5  /  DBili  x   /  AST  8<L>  /  ALT  7<L>  /  AlkPhos  136<H>  03-18    PT/INR - ( 18 Mar 2022 07:17 )   PT: 15.3 sec;   INR: 1.32 ratio         PTT - ( 18 Mar 2022 07:17 )  PTT:33.9 sec      RADIOLOGY & ADDITIONAL TESTS:  Studies reviewed.

## 2022-03-18 NOTE — CONSULT NOTE ADULT - SUBJECTIVE AND OBJECTIVE BOX
VASCULAR SURGERY CONSULT NOTE  --------------------------------------------------------------------------------------------    Patient is a 76y old  Male who presents with a chief complaint of Symptomatic anemia (18 Mar 2022 08:46)      HPI: 75 yo M w/PMH of Prostate Cancer s/p  metastatic to bone s/p CAB/RT with 1 jeol of adjuvant CAB s/p nilandron 150 mg po daily. Pt with CRPR to bone and was put on clinical rial # with  and enzalutamide in  and was on until 21, now pt is on lupron/ enzalutamide since 2022 as the drug was no longer produced. Recently pt has had PSA <1 and has been feeling well. Pretreatment PSA was 59 and has been rising recently from 0.74 to 0.95. Pt came to ED for SOB and weakness. Pt admitted for anemia and infection, transfused 2 units PRBC. Vascular surgery consulted for right thigh mass encasing the SFA. pt was seen at bedside, reports doing well, reports the mass seen 2 months ago and has not groin in size. Pt denies any drainage or tenderness  Patient denies fevers/chills, denies lightheadedness/dizziness, denies SOB/chest pain, denies nausea/vomiting, denies constipation/diarrhea.      ROS: 10-system review is otherwise negative except HPI above.      PAST MEDICAL & SURGICAL HISTORY:  Prostate CA    HTN (hypertension)    Diverticulitis    No significant past surgical history      FAMILY HISTORY:  No pertinent family history in first degree relatives      [] Family history not pertinent as reviewed with the patient and family    SOCIAL HISTORY:  former smoker, occasional EtOH    ALLERGIES: No Known Allergies      HOME MEDICATIONS:       CURRENT MEDICATIONS  MEDICATIONS (STANDING): acetaminophen     Tablet .. 650 milliGRAM(s) Oral once  ampicillin/sulbactam  IVPB      ampicillin/sulbactam  IVPB 3 Gram(s) IV Intermittent every 6 hours  calcium carbonate 1250 mG  + Vitamin D (OsCal 500 + D) 1 Tablet(s) Oral daily  enoxaparin Injectable 40 milliGRAM(s) SubCutaneous every 24 hours  enzalutamide 160 milliGRAM(s) Oral daily  furosemide   Injectable 20 milliGRAM(s) IV Push once  lisinopril 5 milliGRAM(s) Oral daily  multivitamin 1 Tablet(s) Oral daily  sodium chloride 0.9%. 1000 milliLiter(s) IV Continuous <Continuous>    MEDICATIONS (PRN):acetaminophen     Tablet .. 650 milliGRAM(s) Oral every 6 hours PRN Temp greater or equal to 38C (100.4F), Mild Pain (1 - 3)  aluminum hydroxide/magnesium hydroxide/simethicone Suspension 30 milliLiter(s) Oral every 4 hours PRN Dyspepsia  melatonin 3 milliGRAM(s) Oral at bedtime PRN Insomnia  ondansetron Injectable 4 milliGRAM(s) IV Push every 8 hours PRN Nausea and/or Vomiting    --------------------------------------------------------------------------------------------    Vitals:   T(C): 36.8 (22 @ 10:04), Max: 37.6 (22 @ 20:47)  HR: 68 (22 @ 10:04) (66 - 86)  BP: 145/63 (22 @ 10:04) (133/47 - 145/63)  RR: 16 (22 @ 10:04) (16 - 18)  SpO2: 99% (22 @ 10:04) (98% - 99%)    PHYSICAL EXAM:    General: NAD, Lying in bed comfortably, wife at bedside  Neuro: A+Ox3  HEENT: NC/AT, EOMI  Neck: Soft, supple  Cardio: RRR, nml S1/S2  Resp: Good effort, CTA b/l  GI/Abd: Soft, NT/ND, no rebound/guarding, no masses palpated  Vascular: All 4 extremities warm, B/l radial pulses palpable,  b/l Femoral pulse palpable,  b/l DP/PT palpable,  right medial upper thigh 6x 7 cm mass, non-tender to palpation, no erythema or fluctuance or drainage   Skin: Intact, no breakdown  Lymphatic/Nodes: No palpable lymphadenopathy  Musculoskeletal: All 4 extremities moving spontaneously, no limitations.         CAPILLARY BLOOD GLUCOSE        CAPILLARY BLOOD GLUCOSE           @ 07:01  -   @ 07:00  --------------------------------------------------------  IN:    sodium chloride 0.9%: 1000 mL  Total IN: 1000 mL    OUT:    Voided (mL): 400 mL  Total OUT: 400 mL    Total NET: 600 mL        Height (cm): 182.9 (03-15 @ 14:21)  Weight (kg): 72.6 (03-15 @ 14:21)  BMI (kg/m2): 21.7 (03-15 @ 14:21)  BSA (m2): 1.94 (03-15 @ 14:21)    --------------------------------------------------------------------------------------------    LABS  CBC (:17)                              7.0<LL>                         16.67<H>  )----------------(  258        --    % Neutrophils, --    % Lymphocytes, ANC: --                                  23.7<L>  CBC ( @ 07:46)                              7.8<L>                         22.14<H>  )----------------(  330        --    % Neutrophils, --    % Lymphocytes, ANC: --                                  26.8<L>    BMP ( @ 07:17)             137     |  107     |  14    		Ca++ --      Ca 8.4<L>             ---------------------------------( 106<H>		Mg 2.1                3.7     |  25      |  0.67  			Ph 2.6     BMP ( 07:46)             137     |  107     |  14    		Ca++ --      Ca 8.9                ---------------------------------( 97    		Mg 2.0                3.6     |  22      |  0.67  			Ph 2.2<L>    LFTs (:17)      TPro 5.1<L> / Alb 1.3<L> / TBili 0.5 / DBili -- / AST 8<L> / ALT 7<L> / AlkPhos 136<H>  LFTs (:46)      TPro 6.2 / Alb 1.6<L> / TBili 0.7 / DBili -- / AST 10<L> / ALT 8<L> / AlkPhos 168<H>    Coags (:17)  aPTT 33.9 / INR 1.32<H> / PT 15.3<H>          --------------------------------------------------------------------------------------------    MICROBIOLOGY  Urinalysis (03-15 @ 15:25):     Color: Yellow / Appearance: Slightly Turbid<!> / S.020 / pH: 5.0 / Gluc: Negative / Ketones: Trace<!> / Bili: Small<!> / Urobili: 8<!> / Protein :30<!> / Nitrites: Negative / Leuk.Est: Trace<!> / RBC: 3-5<!> / WBC: 3-5 / Sq Epi:  / Non Sq Epi: Occasional / Bacteria Occasional<!>       -> .Blood None Culture (03-15 @ 17:44)     NG    NG    No growth to date.    -> Clean Catch None Culture (03-15 @ 15:25)     NG    NG    <10,000 CFU/mL Normal Urogenital Melinda      --------------------------------------------------------------------------------------------    IMAGING    CTa RLE: Vascular mass involving predominantly the right vastus medialis muscle   and encasing the right superficial femoral artery which is focally   occluded. Recommend further evaluation with MRI.        MRI:Heterogeneously enhancing soft tissue mass within the anteromedial   proximal thigh, described above, concerning for malignancy. Soft tissue   sampling/histopathologic diagnosis is recommended for further evaluation.    Scattered T1 hypointense lesions within the proximal femur and visualized   portions of the pelvis, compatible with metastatic disease.        venous US: Nonocclusive thrombus in the right mid femoral vein at and below the   level of the mass.  No evidence of deep venous thrombosis in the left lower extremity.

## 2022-03-18 NOTE — CONSULT NOTE ADULT - ATTENDING COMMENTS
Agree with above. Thigh CT was reviewed. SFA is occluded at the level of the mass. Profunda artery travels behind the mass. No contraindication for bx. Consider AC given malignancy and presence of femoral DVT.
Ortho stable at this time. Will continue the observation.

## 2022-03-18 NOTE — CONSULT NOTE ADULT - ASSESSMENT
75 yo M h/o prostate CA on chemotherapy  right thigh 6.7 x 6.6 cm mass encasing SFA, occluded   non-occlusive thrombus right femoral vein  palpable pulses b/l LE DP/PT  h/h 7/23    Plan:  trend h/h & transfuse as needed  IR consult for biopsy right thigh mass  f/u Heme/onco reccs  cont abx  can start AC once h/h stable and seem feasible  cont rest of management as per primary team    plan d/w Dr. Echols   
75 y/o M diagnosed with Ethel 4+5 (9) adenocarcinoma of the prostate metastatic to bone w/ recent clinical rial # with  and enzalutamide in 2019 and was on until 12/31/21 presents for symptomatic anemia.    # anemia- slightly microcytic   - blood work reviewed from [facility] 1/5/22 Hb 10.9, pl 276  - Hb 6.7 on arrival, 6.1 on repeat this am, mcv 79.9, RDW 18.7  - transfuse 2u pRBCs today  - send iron studies, b12, folate, FOBT, retic ct  - CTA chest negative for pe    # right groin mass  - 1/5/22 CT c/a/p showed unchanged diffuse osseous mets, no new suspicious findings and unchanged ectasia of right common iliac a measuring 2.3 cm, probably unchanged marked narrowing/near occlusion of proximal right femoral artery.   - pt states it has been enlarging in size since coming off trial   - 3/15/22 CTA right lower extremity noted for : Vascular mass 5x5 cm involving predominantly the right vastus medialis muscle  and encasing the right superficial femoral artery which is focally occluded  - MRI pending for today    will discuss with [facility]    Dr. Chandrakant Ortiz  cell: [phone]  Weekends and nights please call [phone] for MD on call  [facility]  Hematology/Oncology

## 2022-03-18 NOTE — PROGRESS NOTE ADULT - ASSESSMENT
76M with PMHx of Prostate cancer ( diagnosed 10 years ago, follows with Dr. Nora Carlson at [facility]) with mets to the bone, hx of RT.,currently on chemotherapy presented to the ED complaining of a 3 month history of a progressively worsening weakness and fatigue associated shortness of breath and dizziness. Patient states he has become profoundly weak- to the point that he struggles with his activities of daily living. Of note, patient has lost approximately 12 pound in the past 3 months. States he was on a clinical trial that abruptly ended approximately 3 months ago and noticed symptoms shortly after. Also with intermittent chills, hot flashes and diarrhea. Patient also with painless lump to right anterior thigh, which has been present for about 3 months. Patient with bloody bowel movements, vomiting, chest pain or palpitations. He does endorse early satiety/ loss of appetite.      Symptomatic microcytic anemia due to chemotherapy, doubt GI bleed    SOB, chest pain due to severe anemia with abnormal EKG ruled out ACS and PE   Metastatic Prostate Cancer  - 3/16 Hgb 6.1; transfuse 2 units PRBC  - 3/18 Hgb 7; transfuse 1 unit PRBC   - Troponin negative x 2, CP resolved, repeat EKG now with 1st degree A- V block, nonspecific ST changes   - Echo- as above LVEF 55-60%, no RWMA   - Stool OB negative x 2 , iron studies as above   - D-Dimer elevated, likely secondary to Prostate cancer  -CTA chest negative for PE  - doppler LE to r/o DVT- Nonocclusive thrombus in the right mid femoral vein at and below the level of the mass  - On enzalutamide; wife to bring   - Oncology consult   - PT evaluation    Leukocytosis likely 2/2 colitis  - Leukocytosis improving with addition of Unasyn, continue for now   - no focal signs of infection noted  - CT abdo/pelvis as above  - Diarrhea resolved, doubt C.diff  - Trend CBC     Right thigh soft tissue mass   - MRI demonstrates Soft tissue mass, Scattered T1 hypointense lesions within the proximal femur and visualized portions of the pelvis, compatible with metastatic disease  -vascular mass involving predominantly the right vastus medialis muscle and encasing the right superficial femoral artery which is focally occluded.  - currently without pain/limb weakness  - Cont Lovenox; may need Full dose AC, seen by vascular, recs IR for bx, IR recs ortho for possible excisional bx   - Vascular consulted  - IR consulted  - Ortho consulted     Severe Protein Calorie Malnutrition  - seen by registered dietician    Right lower lobe lung nodule  - Pt aware of nodule; States it has been present for 10 years  - Stopped smoking 6 months ago    HTN  - Continue Lisinopril    DVT PPx: Lovenox    Code status: Full code    DISPO: Trend H&H, f/u with ortho and IR, cont Lovenox, may need fulldose AC  76M with PMHx of Prostate cancer ( diagnosed 10 years ago, follows with Dr. Nora Carlson at [facility]) with mets to the bone, hx of RT.,currently on chemotherapy presented to the ED complaining of a 3 month history of a progressively worsening weakness and fatigue associated shortness of breath and dizziness. Patient states he has become profoundly weak- to the point that he struggles with his activities of daily living. Of note, patient has lost approximately 12 pound in the past 3 months. States he was on a clinical trial that abruptly ended approximately 3 months ago and noticed symptoms shortly after. Also with intermittent chills, hot flashes and diarrhea. Patient also with painless lump to right anterior thigh, which has been present for about 3 months. Patient with bloody bowel movements, vomiting, chest pain or palpitations. He does endorse early satiety/ loss of appetite.      Symptomatic microcytic anemia due to chemotherapy, doubt GI bleed    SOB, chest pain due to severe anemia with abnormal EKG ruled out ACS and PE   Metastatic Prostate Cancer  - 3/16 Hgb 6.1; transfuse 2 units PRBC  - 3/18 Hgb 7; transfuse 1 unit PRBC   - Troponin negative x 2, CP resolved, repeat EKG now with 1st degree A- V block, nonspecific ST changes   - Echo- as above LVEF 55-60%, no RWMA   - Stool OB negative x 2 , iron studies as above   - D-Dimer elevated, likely secondary to Prostate cancer  -CTA chest negative for PE  - doppler LE to r/o DVT- Nonocclusive thrombus in the right mid femoral vein at and below the level of the mass  - On enzalutamide; wife to bring   - Oncology consult   - PT evaluation    Leukocytosis likely 2/2 colitis  - Leukocytosis improving with addition of Unasyn, continue for now   - no focal signs of infection noted  - CT abdo/pelvis as above  - Diarrhea resolved, doubt C.diff  - Trend CBC     Right thigh soft tissue mass   - MRI demonstrates Soft tissue mass, Scattered T1 hypointense lesions within the proximal femur and visualized portions of the pelvis, compatible with metastatic disease  -vascular mass involving predominantly the right vastus medialis muscle and encasing the right superficial femoral artery which is focally occluded.  - currently without pain/limb weakness  - Cont Lovenox; may need Full dose AC, seen by vascular, recs IR for bx, IR recs ortho for possible excisional bx   - Vascular consulted  - IR consulted  - Ortho consulted     Severe Protein Calorie Malnutrition  - seen by registered dietician    Right lower lobe lung nodule  - Pt aware of nodule; States it has been present for 10 years  - Stopped smoking 6 months ago    HTN  - Continue Lisinopril    DVT PPx: Lovenox    Code status: Full code    DISPO: Trend H&H, f/u with ortho and IR, cont Lovenox, plan for  fulldose AC one plans for biopsy are clear and biopsy is completed

## 2022-03-18 NOTE — PROGRESS NOTE ADULT - SUBJECTIVE AND OBJECTIVE BOX
CHIEF COMPLAINT: weakness    HPI: 76M with PMHx of Prostate cancer (diagnosed 10 years ago, [place] [facility] [place] mets to the bone, hx of RT; currently on chemotherapy presented to the ED complaining of a 3 month history of a progressively worsening weakness and fatigue associated shortness of breath and dizziness. Patient states he has become profoundly weak- to the point that he struggles with his activities of daily living. Of note, patient has lost approximately 12 pound in the past 3 months. States he was on a clinical trial that abruptly ended approximately 3 months ago and noticed symptoms shortly after. Also with intermittent chills, hot flashes, and diarrhea. Patient also with painless lump to right anterior thigh, which has been present for about 3 months. He does endorse early satiety/ loss of appetite.  In the ED patient with WBC elevated to 21, elevated D-dimer and CTA chest negative for PE.    Interval History: 3/18/22 pt seen and examined at bedside, reports improvement in weakness and fatigue,  No longer dizzy on standing; exertional SOB, resolved. No chest pain or palpitations. Diarrhea has subsided. Denies pain, cough, HA, dysuria, n/v/d, fever, or chills.     REVIEW OF SYSTEMS: All other review of systems is negative unless indicated above.    PHYSICAL EXAM:  Vital Signs Last 24 Hrs: all reviewed  T(C): 37.7 (18 Mar 2022 14:08), Max: 37.7 (18 Mar 2022 14:08)  T(F): 99.9 (18 Mar 2022 14:08), Max: 99.9 (18 Mar 2022 14:08)  HR: 62 (18 Mar 2022 14:08) (62 - 68)  BP: 140/52 (18 Mar 2022 14:08) (133/47 - 145/63)  RR: 16 (18 Mar 2022 14:08) (16 - 16)  SpO2: 97% (18 Mar 2022 14:08) (97% - 99%)    Constitutional: NAD, awake and alert  HEENT: PERR, EOMI  Neck: Soft and supple, No JVD  Respiratory: Breath sounds are clear bilaterally, No wheezing, rales or rhonchi  Cardiovascular: S1 and S2, regular rate and rhythm, no Murmurs  Gastrointestinal: Bowel Sounds present, soft, nontender, nondistended  Extremities: No peripheral edema, painless lump right anterior thigh   Vascular: 2+ peripheral pulses  Neurological: A/O x 3, no focal deficits  Musculoskeletal: 5/5 strength b/l upper and lower extremities  Skin: No rashes    LABS: All Labs Reviewed:                                   7.0    16.67 )-----------( 258      ( 18 Mar 2022 07:17 )             23.7   03-18    137  |  107  |  14  ----------------------------<  106<H>  3.7   |  25  |  0.67    Ca    8.4<L>      18 Mar 2022 07:17  Phos  2.6     03-18  Mg     2.1     03-18    TPro  5.1<L>  /  Alb  1.3<L>  /  TBili  0.5  /  DBili  x   /  AST  8<L>  /  ALT  7<L>  /  AlkPhos  136<H>  03-18    PT/INR - ( 16 Mar 2022 06:47 )   PT: 15.5 sec;   INR: 1.33 ratio    PTT - ( 15 Mar 2022 15:05 )  PTT:37.9 sec  (03.17.22 @ 12:00) Occult Blood, Feces: Negative  (03.17.22 @ 07:46) Lactate Dehydrogenase, Serum: 189 U/L   (03.17.22 @ 07:46) Haptoglobin, Serum: 434 mg/dL   (03.17.22 @ 07:46) Vitamin B12, Serum: 1237 pg/mL  (03.17.22 @ 07:46) Ferritin, Serum: 483 ng/mL  (03.16.22 @ 12:05) Occult Blood, Feces: Negative  (03.16.22 @ 06:47) Troponin I, High Sensitivity Result: 10.29  (03.15.22 @ 15:05) Serum Pro-Brain Natriuretic Peptide: 1217 pg/mL   (03.15.22 @ 17:44) Lactate, Blood: 1.3 mmol/L   (03.16.22 @ 06:47) Thyroid Stimulating Hormone, Serum: 1.26 uU/mL     RADIOLOGY/EKG: all reviewed     MR Femur w/wo IV Cont, Right (03.16.22 @ 22:21)   Findings:  Heterogeneously enhancing T1 hypointense, heterogeneously T2 hyperintense   mass within the anteromedial proximal thigh, which involves portions of   the sartorius muscle, the adductor longus muscle, and adductor brevis.   Mass abuts the vastus medialis muscle. Mass measures approximately 6.4 x   6.5 cm transaxially and 6.6 cm cranial to caudal. Lesion envelops the   proximal superficial femoral artery. There is surrounding reactive edema   and enhancement.  Scattered T1 hypointense lesions are seen within the visualized pelvis   and within the proximal, likely related to metastatic disease.   Incompletely evaluated right hip arthrosis.  Nonspecific subcutaneous edema about the thigh.  Impression:  Heterogeneously enhancing soft tissue mass within the anteromedial   proximal thigh, described above, concerning for malignancy. Soft tissue   sampling/histopathologic diagnosis is recommended for further evaluation.  Scattered T1 hypointense lesions within the proximal femur and visualized   portions of the pelvis, compatible with metastatic disease.  < end of copied text >    CT Abdomen and Pelvis w/ Oral Cont and w/ IV Cont (03.17.22 @ 09:50)   FINDINGS:  LOWER CHEST: Small right-sided pleural effusion. No consolidative opacity   in the lung bases. Cardiomegaly and coronary artery disease.  LIVER: Fatty changes along the falciform ligament. No focal liver lesions.  BILE DUCTS: Normal caliber.  GALLBLADDER: Within normal limits.  SPLEEN: Within normal limits.  PANCREAS: Within normal limits.  ADRENALS: Within normal limits.  KIDNEYS/URETERS: Subcentimetercortical hypodensity in the left kidney   lower pole, too small to characterize. Otherwise normal appearance of   bilateral kidneys. No hydronephrosis or hydroureter.  BLADDER: Slightly elevated attenuation values of the urine in the urinary   bladder. Correlate with urinalysis for hematuria or infection.  REPRODUCTIVE ORGANS: Unchanged metallic foci in the prostate, correlate   with prior intervention.  BOWEL: Significant pancolonic diverticulosis throughout the colon without   signs of acute inflammation. Colon is mostly collapsed. There is   suggestion of edematous wall thickening involving the ascending colon and   to a lesser degree the right transverse colon with surrounding fat   stranding. Numerous small bowel diverticula are identified. There is   displacement of small bowel loops which are mildly dilated into the left   upper quadrant which may be secondary to adhesions. Incomplete swirling   of the mesenteric axis however less pronounced than on prior study dated   12/24/2020 and similar to post operative study 12/25/2020. No definite   small bowel obstruction. Appendix is not well visualized.  PERITONEUM: No ascites.  VESSELS: Abdominal aorta with atherosclerotic changes measures 2.9 cm   right common iliac aneurysm measuring 2.3 cm.Swirling of the mesenteric   axis similar compared to postoperative scan 12/25/2020 with widely patent   SMA and SMV. Small focal caliber change of branches of the SMV on image   2-52 as on prior study. Hepatic veins, portal vein and IVC are patent.  RETROPERITONEUM/LYMPH NODES: No lymphadenopathy.  ABDOMINAL WALL: Mild anasarca.  BONES: Extensive sclerotic disease involving the entire visualized   skeleton without significant changes compared to prior.  IMPRESSION:  Diffuse edematous wall thickening of the ascending and right transverse   colon with surrounding fat stranding concerning for colitis.  Pancolonic diverticulosis and small bowel diverticulosis without acute   inflammation of the individual diverticula.  No bowel obstruction; persistent incomplete swirling of the mesentery   axis with widely patent SMA and SMV as described and unchanged when   compared to prior study 12/25/2020.  Increased attenuation of the urine in the bladder, correlate with   urinalysis for hematuria and/or infection.  < end of copied text >    US Duplex Venous Lower Ext Complete, Bilateral (03.17.22 @ 10:35)   FINDINGS:  RIGHT:  Normal compressibility of the RIGHT common femoral, proximal and distal   femoral and popliteal veins. There is nonocclusive thrombus in the right   mid femoral vein.  Doppler examination shows normal spontaneous and phasic flow in the   patent segments.  No RIGHT calf vein thrombosis is detected.  4.1 cm popliteal cyst.  LEFT:  Normal compressibility of the LEFTcommon femoral, femoral and popliteal   veins.  Doppler examination shows normal spontaneous and phasic flow.  No LEFT calf vein thrombosis is detected.  3.2 cm popliteal cyst.  IMPRESSION:  Nonocclusive thrombus in the right mid femoral vein at and below the   level of the mass.  No evidence of deep venous thrombosis in the left lower extremity.  < end of copied text >    12 Lead ECG (03.16.22 @ 10:20)   Ventricular Rate 87 BPM  Atrial Rate 87 BPM  P-R Interval 296 ms  QRS Duration 108 ms  Q-T Interval 392 ms  QTC Calculation(Bazett) 471 ms  R Axis -65 degrees  T Axis 75 degrees  Sinus rhythm with sinus arrhythmia with 1st degree A-V block with occasional Premature ventricular complexes  Left anterior fascicular block  Nonspecific ST abnormality  Abnormal ECG  < end of copied text >    TTE Echo Complete w/o Contrast w/ Doppler (03.17.22 @ 11:13)   Impression  Summary  Fibrocalcific changes noted to the mitral valve leaflets with preserved leaflet excursion.   Mild mitral annular calcification is present.   Mild (1+) mitral regurgitation is present.   Mild aortic sclerosis is present with restricted valvular opening.   Transaortic gradients are elevated but do not meet the criteria for   aortic   stenosis.   The aortic root is at the upper limits of normal in size.   The tricuspid valve leaflets are thin and pliable; valve motion is   normal.   Trace to mild tricuspid valve regurgitation is present.   Mild pulmonary hypertension.   The left atrium is mildly dilated.   Mild concentric left ventricular hypertrophy is present.   Estimated left ventricular ejection fraction is 55-60 %.   An interatrial septal aneurysm is noted.   The IVC is mildly dilated.  < end of copied text >    12 Lead ECG (03.15.22 @ 14:24)   Ventricular Rate 71 BPM  Atrial Rate 71 BPM  P-R Interval 192 ms  QRS Duration 108 ms  Q-T Interval 414 ms  QTC Calculation(Bazett) 449 ms  R Axis -46 degrees  T Axis 75 degrees  Sinus rhythm with Premature atrial complexes  Left anterior fascicular block  T wave abnormality, consider lateral ischemia  Abnormal ECG  < end of copied text >    Xray Chest 1 View- PORTABLE-Urgent (03.15.22 @ 15:09)   FINDINGS:  The cardiac silhouette is normal in size. There are no focal consolidations or pleural effusions. The hilar and mediastinal structures appear unremarkable. The osseous structures are intact.  IMPRESSION: Clear lungs.  < end of copied text >    CT Angio Lower Extremity w/ IV Cont, Right (03.15.22 @ 16:25)   FINDINGS:  The distal right common iliac, external iliac, internal iliac, common femoral, and deep femoral arteries are patent. The proximal superficial femoral artery is patent with calcific plaque. An infiltrative vascular mass involving predominantly the vastus medialis encases the mid superficial femoral artery which is occluded and then reconstituted in the distal thigh. The popliteal artery and posterior tibial trunk are patent. The mass measures approximately 5.2 x 5.9 x 7 cm. (3:65, 612:28)  Diffuse blastic changes are identified in the visualized portions of the right iliac bone, sacrum, and proximal femur.  IMPRESSION:  Vascular mass involving predominantly the right vastus medialis muscle and encasing the right superficial femoral artery which is focally occluded. Recommend further evaluation with MRI.  Local skeletal blastic changes  < end of copied text >    CT Angio Chest PE Protocol w/ IV Cont (03.15.22 @ 16:30)   FINDINGS:  PULMONARY ARTERIES: No pulmonary embolism.  MEDIASTINUM: Normal heart size. Coronary atherosclerosis. No pericardial effusion. Thoracic aorta normal caliber.  No large mediastinal lymph nodes.  AIRWAYS, LUNGS, PLEURA: Tracheal secretions. Emphysema. Right lower lobe superior segment 0.7 cm nodule (image 48, series 2). No consolidation. No pleural effusion.  IMAGED ABDOMEN: Unremarkable.  SOFT TISSUES: Unremarkable.  BONES: Diffuse osseous sclerosis.  IMPRESSION:.  No pulmonary embolism.  Right lower lobe 0.7 cm nodule; recommend CT chest follow-up in 6 months to ensure stability.  < end of copied text >    MEDICATIONS:  MEDICATIONS  (STANDING):  acetaminophen     Tablet .. 650 milliGRAM(s) Oral once  ampicillin/sulbactam  IVPB      ampicillin/sulbactam  IVPB 3 Gram(s) IV Intermittent every 6 hours  calcium carbonate 1250 mG  + Vitamin D (OsCal 500 + D) 1 Tablet(s) Oral daily  enoxaparin Injectable 40 milliGRAM(s) SubCutaneous every 24 hours  enzalutamide 160 milliGRAM(s) Oral daily  furosemide   Injectable 20 milliGRAM(s) IV Push once  lisinopril 5 milliGRAM(s) Oral daily  multivitamin 1 Tablet(s) Oral daily  sodium chloride 0.9%. 1000 milliLiter(s) (75 mL/Hr) IV Continuous <Continuous>    MEDICATIONS  (PRN):  acetaminophen     Tablet .. 650 milliGRAM(s) Oral every 6 hours PRN Temp greater or equal to 38C (100.4F), Mild Pain (1 - 3)  aluminum hydroxide/magnesium hydroxide/simethicone Suspension 30 milliLiter(s) Oral every 4 hours PRN Dyspepsia  melatonin 3 milliGRAM(s) Oral at bedtime PRN Insomnia  ondansetron Injectable 4 milliGRAM(s) IV Push every 8 hours PRN Nausea and/or Vomiting

## 2022-03-18 NOTE — PROGRESS NOTE ADULT - ASSESSMENT
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    # anemia- slightly microcytic   - still anemic - Hb stable at 7.8->7 no signs of bleeding   - FOBT negative  - would recommend 1u pRBC transfusion today    # right groin mass  - - 1/5/22 CT c/a/p showed unchanged diffuse osseous mets, no new suspicious findings and unchanged ectasia of right common iliac a measuring 2.3 cm, probably unchanged marked narrowing/near occlusion of proximal right femoral artery.   - pt states it has been enlarging in size since coming off trial   - 3/15/22 CTA right lower extremity noted for : Vascular mass 5x5 cm involving predominantly the right vastus medialis muscle  and encasing the right superficial femoral artery which is focally occluded  -  Right groin mass with mass effect on adjacent vascular structures on MRI 3/17  - significantly increased in size from past- states that he has had it for last 10 years but was always around 2 cm when being monitored, now up to 6 cm  - now found to have nonocclusive vte   - would recommend biopsy of mass in groin , will need IR Consult prior to biopsy to assess vascularity of mass prior to biopsy especially while on full dose AC  - pending consult today    # nonocclusive thrombus  - likely secondary to vascular obstruction / compromise from mass effect from proximal thigh mass  - would continue with lovenox now on prophylactic dosing-- will reassess prior to possible biopsy   - 3/17 US LE doppler - Nonocclusive thrombus in the right mid femoral vein at and below the level of the mass. No evidence of deep venous thrombosis in the left lower extremity.      # Leukocytosis - likely reactive neutrophil predominant    Dr. Chandrakant Ortiz  cell: [phone]  Weekends and nights please call [phone] for MD on call  [facility]  Hematology/Oncology

## 2022-03-18 NOTE — CONSULT NOTE ADULT - SUBJECTIVE AND OBJECTIVE BOX
Patient is a 76yMale community-ambulator without assistive devices who presents to the  ED with a c/o of diffuse weakness accompanied by fatigue and shortness of breath. Patient with known history of prostate CA with diffuse bony metastases. Orthopaedic team consulted for MRI Right Femur demonstrating an anterior medial enhancing soft tissue mass concerning for malignancy and scattered T1 hypointense proximal femoral lesion. Mass also demonstrated on CT vascular mass invading the vastus medialis and encasing the focally occluded superior femoral artery.  Denies any recent trauma or falls. Denies HS/LOC. Denies pain associated with the lesion. Endorses unimpaired to ambulate immediately. Denies any numbness or tingling in the RLE. Denies having any other pain elsewhere. No other orthopaedic concerns at this time.    PAST MEDICAL & SURGICAL HISTORY:  Prostate CA    HTN (hypertension)    Diverticulitis    No significant past surgical history    No Known Allergies      PHYSICAL EXAM:  T(C): 37.1 (03-18-22 @ 18:59), Max: 37.7 (03-18-22 @ 14:08)  HR: 51 (03-18-22 @ 18:59) (51 - 68)  BP: 138/53 (03-18-22 @ 18:59) (133/47 - 152/55)  RR: 18 (03-18-22 @ 18:59) (16 - 18)  SpO2: 95% (03-18-22 @ 18:59) (95% - 99%)    Gen: NAD, Resting comfortably    RLE:  Skin intact; Palpable, non-tender, non-mobile mass in the anteromedial thigh.  No bony tenderness to palpation.   +Q/H/TA/EHL/FHL/GSC.   +SILT L3-S1.   + DP.   Compartments soft and compressible.   No calf tenderness.     Secondary Survey:   LLE/RUE/LUE: No TTP over bony prominences, SILT, palpable pulses, full/painless range of motion, compartments soft  Spine: No bony tenderness. No palpable step-offs.       A/P:  Patient is a 76y Male w/ RLE mass identified on MRI/CT LLE.     - Medical management appreciated.   - Due to the nature of the lesions proximity to the femoral artery, further management per Vascular Surgery Team.   - No acute orthopaedic surgical intervention indicated at this time.  - FU XR R Pelvis/Hip/TibFib.   - Multimodal analgesia - Recommend low dose opioids and acetaminophen as tolerated as needed.   - WBAT RLE  - DVT Ppx: Per Primary Team.   - PT/OT.   - Ice and elevate as tolerated.    - Discussed with Dr. Low who is aware of and in agreement with the above plan.        Patient is a 76yMale community-ambulator without assistive devices who presents to the  ED with a c/o of diffuse weakness accompanied by fatigue and shortness of breath. Patient with known history of prostate CA with diffuse bony metastases. Orthopaedic team consulted for MRI Right Femur demonstrating an anterior medial enhancing soft tissue mass concerning for malignancy and scattered T1 hypointense proximal femoral lesion. Mass also demonstrated on CT vascular mass invading the vastus medialis and encasing the focally occluded superior femoral artery.  Denies any recent trauma or falls. Denies HS/LOC. Denies pain associated with the lesion. Endorses unimpaired to ambulate immediately. Denies any numbness or tingling in the RLE. Denies having any other pain elsewhere. No other orthopaedic concerns at this time.    PAST MEDICAL & SURGICAL HISTORY:  Prostate CA    HTN (hypertension)    Diverticulitis    No significant past surgical history    No Known Allergies      PHYSICAL EXAM:  T(C): 37.1 (03-18-22 @ 18:59), Max: 37.7 (03-18-22 @ 14:08)  HR: 51 (03-18-22 @ 18:59) (51 - 68)  BP: 138/53 (03-18-22 @ 18:59) (133/47 - 152/55)  RR: 18 (03-18-22 @ 18:59) (16 - 18)  SpO2: 95% (03-18-22 @ 18:59) (95% - 99%)    Gen: NAD, Resting comfortably    RLE:  Skin intact; Palpable, non-tender, non-mobile mass in the anteromedial thigh.  No bony tenderness to palpation.   +Q/H/TA/EHL/FHL/GSC.   +SILT L3-S1.   + DP.   Compartments soft and compressible.   No calf tenderness.     Secondary Survey:   LLE/RUE/LUE: No TTP over bony prominences, SILT, palpable pulses, full/painless range of motion, compartments soft  Spine: No bony tenderness. No palpable step-offs.       A/P:  Patient is a 76y Male w/ RLE mass identified on MRI/CT LLE.     - Medical management appreciated.   - Due to the nature of the lesions proximity to the femoral artery, further management per Vascular Surgery Team. Agree with IR biopsy.  - No acute orthopaedic surgical intervention indicated at this time.  - FU XR R Pelvis/Hip/TibFib.   - Multimodal analgesia - Recommend low dose opioids and acetaminophen as tolerated as needed.   - WBAT RLE  - DVT Ppx: Per Primary Team.   - PT/OT.   - Ice and elevate as tolerated.    - Discussed with Dr. Low who is aware of and in agreement with the above plan.

## 2022-03-19 LAB
ANION GAP SERPL CALC-SCNC: 7 MMOL/L — SIGNIFICANT CHANGE UP (ref 5–17)
BUN SERPL-MCNC: 10 MG/DL — SIGNIFICANT CHANGE UP (ref 7–23)
CALCIUM SERPL-MCNC: 8.6 MG/DL — SIGNIFICANT CHANGE UP (ref 8.5–10.1)
CHLORIDE SERPL-SCNC: 106 MMOL/L — SIGNIFICANT CHANGE UP (ref 96–108)
CO2 SERPL-SCNC: 23 MMOL/L — SIGNIFICANT CHANGE UP (ref 22–31)
CREAT SERPL-MCNC: 0.52 MG/DL — SIGNIFICANT CHANGE UP (ref 0.5–1.3)
EGFR: 105 ML/MIN/1.73M2 — SIGNIFICANT CHANGE UP
GLUCOSE SERPL-MCNC: 104 MG/DL — HIGH (ref 70–99)
HCT VFR BLD CALC: 24.4 % — LOW (ref 39–50)
HCT VFR BLD CALC: 25.5 % — LOW (ref 39–50)
HGB BLD-MCNC: 7.4 G/DL — LOW (ref 13–17)
HGB BLD-MCNC: 7.6 G/DL — LOW (ref 13–17)
MAGNESIUM SERPL-MCNC: 1.9 MG/DL — SIGNIFICANT CHANGE UP (ref 1.6–2.6)
MCHC RBC-ENTMCNC: 24.5 PG — LOW (ref 27–34)
MCHC RBC-ENTMCNC: 29.8 GM/DL — LOW (ref 32–36)
MCV RBC AUTO: 82.3 FL — SIGNIFICANT CHANGE UP (ref 80–100)
PHOSPHATE SERPL-MCNC: 2.6 MG/DL — SIGNIFICANT CHANGE UP (ref 2.5–4.5)
PLATELET # BLD AUTO: 249 K/UL — SIGNIFICANT CHANGE UP (ref 150–400)
POTASSIUM SERPL-MCNC: 3.3 MMOL/L — LOW (ref 3.5–5.3)
POTASSIUM SERPL-SCNC: 3.3 MMOL/L — LOW (ref 3.5–5.3)
RBC # BLD: 3.1 M/UL — LOW (ref 4.2–5.8)
RBC # FLD: 18.4 % — HIGH (ref 10.3–14.5)
SODIUM SERPL-SCNC: 136 MMOL/L — SIGNIFICANT CHANGE UP (ref 135–145)
WBC # BLD: 17.36 K/UL — HIGH (ref 3.8–10.5)
WBC # FLD AUTO: 17.36 K/UL — HIGH (ref 3.8–10.5)

## 2022-03-19 PROCEDURE — 99232 SBSQ HOSP IP/OBS MODERATE 35: CPT

## 2022-03-19 RX ORDER — POTASSIUM CHLORIDE 20 MEQ
40 PACKET (EA) ORAL EVERY 4 HOURS
Refills: 0 | Status: COMPLETED | OUTPATIENT
Start: 2022-03-19 | End: 2022-03-19

## 2022-03-19 RX ORDER — AMLODIPINE BESYLATE 2.5 MG/1
2.5 TABLET ORAL ONCE
Refills: 0 | Status: COMPLETED | OUTPATIENT
Start: 2022-03-19 | End: 2022-03-19

## 2022-03-19 RX ADMIN — AMLODIPINE BESYLATE 2.5 MILLIGRAM(S): 2.5 TABLET ORAL at 17:27

## 2022-03-19 RX ADMIN — Medication 40 MILLIEQUIVALENT(S): at 21:20

## 2022-03-19 RX ADMIN — Medication 40 MILLIEQUIVALENT(S): at 17:22

## 2022-03-19 RX ADMIN — ENOXAPARIN SODIUM 40 MILLIGRAM(S): 100 INJECTION SUBCUTANEOUS at 09:55

## 2022-03-19 RX ADMIN — AMPICILLIN SODIUM AND SULBACTAM SODIUM 200 GRAM(S): 250; 125 INJECTION, POWDER, FOR SUSPENSION INTRAMUSCULAR; INTRAVENOUS at 06:00

## 2022-03-19 RX ADMIN — SODIUM CHLORIDE 75 MILLILITER(S): 9 INJECTION INTRAMUSCULAR; INTRAVENOUS; SUBCUTANEOUS at 09:55

## 2022-03-19 RX ADMIN — Medication 1 TABLET(S): at 09:55

## 2022-03-19 RX ADMIN — AMPICILLIN SODIUM AND SULBACTAM SODIUM 200 GRAM(S): 250; 125 INJECTION, POWDER, FOR SUSPENSION INTRAMUSCULAR; INTRAVENOUS at 17:22

## 2022-03-19 RX ADMIN — AMPICILLIN SODIUM AND SULBACTAM SODIUM 200 GRAM(S): 250; 125 INJECTION, POWDER, FOR SUSPENSION INTRAMUSCULAR; INTRAVENOUS at 13:00

## 2022-03-19 RX ADMIN — LISINOPRIL 5 MILLIGRAM(S): 2.5 TABLET ORAL at 09:55

## 2022-03-19 RX ADMIN — ENZALUTAMIDE 160 MILLIGRAM(S): 80 TABLET ORAL at 09:57

## 2022-03-19 NOTE — PROVIDER CONTACT NOTE (OTHER) - SITUATION
answering service aware of consult.
Patient needs H/H redraw after blood
spoke with Beatriz in office regarding consult

## 2022-03-19 NOTE — PROGRESS NOTE ADULT - SUBJECTIVE AND OBJECTIVE BOX
Patient seen and examine felice bedside this morning. Denies pain, weakness, numbness, or tingling in the RLE. Continues to deny impaired ambulation. Denies any perceived changes in R thigh mass. Denies fevers, chills, headaches chest pain, shortness of breath, nausea, vomiting, or other orthopaedic complaints at time of current encounter.       VITALS  T(C): 36.6 (19 Mar 2022 08:54), Max: 37.7 (18 Mar 2022 14:08)  T(F): 97.8 (19 Mar 2022 08:54), Max: 99.9 (18 Mar 2022 14:08)  HR: 58 (19 Mar 2022 08:54) (51 - 68)  BP: 157/56 (19 Mar 2022 08:54) (138/53 - 157/56)  RR: 17 (19 Mar 2022 08:54) (16 - 18)  SpO2: 94% (19 Mar 2022 08:54) (94% - 99%)        LABS:                        7.6    17.36 )-----------( 249      ( 19 Mar 2022 06:17 )             25.5     03-19    136  |  106  |  10  ----------------------------<  104<H>  3.3<L>   |  23  |  0.52    Ca    8.6      19 Mar 2022 06:17  Phos  2.6     03-19  Mg     1.9     03-19    TPro  5.1<L>  /  Alb  1.3<L>  /  TBili  0.5  /  DBili  x   /  AST  8<L>  /  ALT  7<L>  /  AlkPhos  136<H>  03-18    PT/INR - ( 18 Mar 2022 07:17 )   PT: 15.3 sec;   INR: 1.32 ratio       PTT - ( 18 Mar 2022 07:17 )  PTT:33.9 sec            Gen: NAD, Resting comfortably    RLE:  Skin intact; Palpable, non-tender, non-mobile mass in the anteromedial thigh.  No bony tenderness to palpation.   +Q/H/TA/EHL/FHL/GSC.   +SILT L3-S1.   + DP.   Compartments soft and compressible.   No calf tenderness.       A/P:  Patient is a 76y Male w/ RLE mass identified on MRI/CT LLE.     - Medical management appreciated.   - Due to the nature of the lesions proximity to the femoral artery, further management per Vascular Surgery Team. Agree with IR biopsy.  - No acute orthopaedic surgical intervention indicated at this time.  - FU XR R Pelvis/Hip/TibFib.   - Multimodal analgesia - Recommend low dose opioids and acetaminophen as tolerated as needed.   - WBAT RLE  - DVT Ppx: Per Primary Team.   - PT/OT.   - Ice and elevate as tolerated.    - Discussed with Dr. Low who is aware of and in agreement with the above plan.

## 2022-03-19 NOTE — PROGRESS NOTE ADULT - SUBJECTIVE AND OBJECTIVE BOX
INTERVAL HPI/OVERNIGHT EVENTS:  Patient S&E at bedside. No o/n events, patient resting comfortably. No complaints at this time. Patient denies fever, chills, dizziness, weakness, CP, palpitations, SOB, cough, N/V/D/C, dysuria, changes in bowel movements, LE edema.  patient growing frustrated with need to stay longer.   VITAL SIGNS:  T(F): 99.6 (03-19-22 @ 15:23)  HR: 89 (03-19-22 @ 15:23)  BP: 170/67 (03-19-22 @ 16:46)  RR: 18 (03-19-22 @ 15:23)  SpO2: 96% (03-19-22 @ 15:23)  Wt(kg): --    PHYSICAL EXAM:    Constitutional: NAD  Eyes: EOMI, sclera non-icteric  Neck: supple, no masses, no JVD  Respiratory: CTA b/l, good air entry b/l  Cardiovascular: RRR, no M/R/G  Gastrointestinal: soft, NTND, no masses palpable, + BS, no hepatosplenomegaly  Extremities: RIGHT medial Upper thigh mass NT   Neurological: AAOx3      MEDICATIONS  (STANDING):  ampicillin/sulbactam  IVPB      ampicillin/sulbactam  IVPB 3 Gram(s) IV Intermittent every 6 hours  calcium carbonate 1250 mG  + Vitamin D (OsCal 500 + D) 1 Tablet(s) Oral daily  enoxaparin Injectable 40 milliGRAM(s) SubCutaneous every 24 hours  enzalutamide 160 milliGRAM(s) Oral daily  lisinopril 5 milliGRAM(s) Oral daily  multivitamin 1 Tablet(s) Oral daily  potassium chloride    Tablet ER 40 milliEquivalent(s) Oral every 4 hours    MEDICATIONS  (PRN):  acetaminophen     Tablet .. 650 milliGRAM(s) Oral every 6 hours PRN Temp greater or equal to 38C (100.4F), Mild Pain (1 - 3)  aluminum hydroxide/magnesium hydroxide/simethicone Suspension 30 milliLiter(s) Oral every 4 hours PRN Dyspepsia  melatonin 3 milliGRAM(s) Oral at bedtime PRN Insomnia  ondansetron Injectable 4 milliGRAM(s) IV Push every 8 hours PRN Nausea and/or Vomiting      Allergies    No Known Allergies    Intolerances        LABS:                        7.6    17.36 )-----------( 249      ( 19 Mar 2022 06:17 )             25.5     03-19    136  |  106  |  10  ----------------------------<  104<H>  3.3<L>   |  23  |  0.52    Ca    8.6      19 Mar 2022 06:17  Phos  2.6     03-19  Mg     1.9     03-19    TPro  5.1<L>  /  Alb  1.3<L>  /  TBili  0.5  /  DBili  x   /  AST  8<L>  /  ALT  7<L>  /  AlkPhos  136<H>  03-18    PT/INR - ( 18 Mar 2022 07:17 )   PT: 15.3 sec;   INR: 1.32 ratio         PTT - ( 18 Mar 2022 07:17 )  PTT:33.9 sec      RADIOLOGY & ADDITIONAL TESTS:  Studies reviewed.    ASSESSMENT & PLAN:

## 2022-03-19 NOTE — PROGRESS NOTE ADULT - ASSESSMENT
76M with PMHx of Prostate cancer ( diagnosed 10 years ago, [place] with [place]. Nora Carlson at [facility] [place] with mets to the bone, hx of RT.,currently on chemotherapy presented to the ED complaining of a 3 month history of a progressively worsening weakness and fatigue associated shortness of breath and dizziness. Patient states he has become profoundly weak- to the point that he struggles with his activities of daily living. Of note, patient has lost approximately 12 pound in the past 3 months. States he was on a clinical trial that abruptly ended approximately 3 months ago and noticed symptoms shortly after. Also with intermittent chills, hot flashes and diarrhea. Patient also with painless lump to right anterior thigh, which has been present for about 3 months. Patient with bloody bowel movements, vomiting, chest pain or palpitations. He does endorse early satiety/ loss of appetite.      Symptomatic microcytic anemia due to chemotherapy, doubt GI bleed    SOB, chest pain due to severe anemia with abnormal EKG ruled out ACS and PE   Metastatic Prostate Cancer  - 3/16 Hgb 6.1; transfuse 2 units PRBC  - 3/18 Hgb 7; transfuse 1 unit PRBC   - Troponin negative x 2, CP resolved, repeat EKG now with 1st degree A- V block, nonspecific ST changes   - Echo- as above LVEF 55-60%, no RWMA   - Stool OB negative x 2 , iron studies as above   - D-Dimer elevated, likely secondary to Prostate cancer  -CTA chest negative for PE  - doppler LE to r/o DVT- Nonocclusive thrombus in the right mid femoral vein at and below the level of the mass  - On enzalutamide; wife to bring   - Oncology consult   - PT evaluation    Leukocytosis likely 2/2 colitis  - Leukocytosis improving with addition of Unasyn, continue for now   - no focal signs of infection noted  - CT abdo/pelvis as above  - Diarrhea resolved, doubt C.diff  - Trend CBC     Right thigh soft tissue mass   - MRI demonstrates Soft tissue mass, Scattered T1 hypointense lesions within the proximal femur and visualized portions of the pelvis, compatible with metastatic disease  -vascular mass involving predominantly the right vastus medialis muscle and encasing the right superficial femoral artery which is focally occluded.  - currently without pain/limb weakness  - Cont Lovenox; may need Full dose AC, seen by vascular, recs IR for bx, IR recs ortho for possible excisional bx   - Vascular consulted  - IR consulted  - Ortho consulted     Severe Protein Calorie Malnutrition  - seen by registered dietician    Right lower lobe lung nodule  - Pt aware of nodule; States it has been present for 10 years  - Stopped smoking 6 months ago    HTN  - Continue Lisinopril    DVT PPx: Lovenox    Code status: Full code    DISPO: Trend H&H, f/u with ortho and IR, cont Lovenox, plan for  fulldose AC one plans for biopsy are clear and biopsy is completed  76M with PMHx of Prostate cancer ( diagnosed 10 years ago, follows with Dr. Nora Carlson at [facility] [place]) with mets to the bone, hx of RT.,currently on chemotherapy presented to the ED complaining of a 3 month history of a progressively worsening weakness and fatigue associated shortness of breath and dizziness. Patient states he has become profoundly weak- to the point that he struggles with his activities of daily living. Of note, patient has lost approximately 12 pound in the past 3 months. States he was on a clinical trial that abruptly ended approximately 3 months ago and noticed symptoms shortly after. Also with intermittent chills, hot flashes and diarrhea. Patient also with painless lump to right anterior thigh, which has been present for about 3 months. Patient with bloody bowel movements, vomiting, chest pain or palpitations. He does endorse early satiety/ loss of appetite.    Symptomatic microcytic anemia due to chemotherapy, doubt GI bleed  - FOBT NEGATIVE  SOB, chest pain due to severe anemia with abnormal EKG ruled out ACS and PE   Metastatic Prostate Cancer  RT thigh mass - vascular vs other   - 3/16 Hgb 6.1; transfuse 2 units PRBC  - 3/18 Hgb 7; transfuse 1 unit PRBC   - Echo- as above LVEF 55-60%, no RWMA   -CTA chest negative for PE  - doppler LE to r/o DVT- Nonocclusive thrombus in the right mid femoral vein at and below the level of the mass  - On enzalutamide; wife to bring     Right thigh soft tissue mass   - MRI demonstrates Soft tissue mass, Scattered T1 hypointense lesions within the proximal femur and visualized portions of the pelvis, compatible with metastatic disease -vascular mass involving predominantly the right vastus medialis muscle and encasing the right superficial femoral artery which is focally occluded.  - currently without pain/limb weakness/ischemic changes   - Cont Lovenox; hold off on  Full dose AC at this time in case this mass bleeds - plan for IR biopsy soon  seen by vascular and ortho as well     Leukocytosis likely 2/2 colitis   - On Unasyn, continue for now - augmentin on dc     Severe Protein Calorie Malnutrition  - seen by registered dietician    Right lower lobe lung nodule  - Pt aware of nodule; States it has been present for 10 years  - Stopped smoking 6 months ago    HTN  - Continue Lisinopril    Hypokalemia  replete prn     DVT PPx: Lovenox    Code status: Full code    DISPO: Trend H&H, f/u with IR, cont Lovenox, plan for  fulldose AC one plans for biopsy are clear and biopsy is completed

## 2022-03-19 NOTE — CHART NOTE - NSCHARTNOTEFT_GEN_A_CORE
Chart Note: Orthopaedic Surgery - Oncology      Attending Orthopaedist: Dr. Parminder Henry MD      Patient's medical history, clinical presentation, and imaging studies discussed with Dr. Parminder Henry MD. Orthopaedic Oncology.     Recommend proceeding with Vascular / Interventional Radiology biopsy of Right anteromedial thigh mass. Diffuse bony metastases appreciated; No acute or impending pathologic fractures identified on XR/CT/MR imaging. Further Orthopaedic intervention on the context of biopsy results; No acute orthopaedic intervention indicated at this time. Any potential future orthopaedic surgical intervention surrounding mass excision would require Vascular Surgery personnel. Will continue to follow patient clinically.     Pursue NM Bone Scan.    Recommend RLE MR Angiography vs RLE Arteriogram per Vascular / Interventional Radiology preference.

## 2022-03-19 NOTE — PROGRESS NOTE ADULT - NUTRITIONAL ASSESSMENT
This patient has been assessed with a concern for Malnutrition and has been determined to have a diagnosis/diagnoses of Severe protein-calorie malnutrition.    This patient is being managed with:   Diet Regular-  Entered: Mar 15 2022  6:14PM    
This patient has been assessed with a concern for Malnutrition and has been determined to have a diagnosis/diagnoses of Severe protein-calorie malnutrition.    This patient is being managed with:   Diet Regular-  Entered: Mar 15 2022  6:14PM    
This patient has been assessed with a concern for Malnutrition and has been determined to have a diagnosis/diagnoses of Severe protein-calorie malnutrition.    This patient is being managed with:   Diet Regular-  Entered: Mar 15 2022  6:14PM      This patient has been assessed with a concern for Malnutrition and has been determined to have a diagnosis/diagnoses of Severe protein-calorie malnutrition.    This patient is being managed with:   Diet Regular-  Entered: Mar 15 2022  6:14PM    
This patient has been assessed with a concern for Malnutrition and has been determined to have a diagnosis/diagnoses of Severe protein-calorie malnutrition.    This patient is being managed with:   Diet Regular-  Entered: Mar 15 2022  6:14PM

## 2022-03-19 NOTE — PROGRESS NOTE ADULT - SUBJECTIVE AND OBJECTIVE BOX
CHIEF COMPLAINT: weakness  HPI: 76M with PMHx of Prostate cancer (diagnosed 10 years ago, follows with Dr. Nora Carlson at [facility] [place]) with mets to the bone, hx of RT; currently on chemotherapy presented to the ED complaining of a 3 month history of a progressively worsening weakness and fatigue associated shortness of breath and dizziness. Patient states he has become profoundly weak- to the point that he struggles with his activities of daily living. Of note, patient has lost approximately 12 pound in the past 3 months. States he was on a clinical trial that abruptly ended approximately 3 months ago and noticed symptoms shortly after. Also with intermittent chills, hot flashes, and diarrhea. Patient also with painless lump to right anterior thigh, which has been present for about 3 months. He does endorse early satiety/ loss of appetite.  In the ED patient with WBC elevated to 21, elevated D-dimer and CTA chest negative for PE.    Vital Signs Last 24 Hrs  T(F): 99.6 (19 Mar 2022 15:23), Max: 99.6 (19 Mar 2022 15:23)  HR: 89 (19 Mar 2022 15:23) (51 - 89)  BP: 172/69 (19 Mar 2022 15:23) (138/53 - 172/69)  RR: 18 (19 Mar 2022 15:23) (17 - 18)  SpO2: 96% (19 Mar 2022 15:23) (94% - 96%)  Constitutional: NAD, awake and alert  HEENT: PERR, EOMI  Neck: Soft and supple, No JVD  Respiratory: Breath sounds are clear bilaterally, No wheezing, rales or rhonchi  Cardiovascular: S1 and S2, regular rate and rhythm, no Murmurs  Gastrointestinal: Bowel Sounds present, soft, nontender, nondistended  Extremities: No peripheral edema, painless lump right anterior thigh   Vascular: 2+ peripheral pulses  Neurological: A/O x 3, no focal deficits  Musculoskeletal: 5/5 strength b/l upper and lower extremities  Skin: No rashes      MR Femur w/wo IV Cont, Right (03.16.22 @ 22:21)   Heterogeneously enhancing soft tissue mass within the anteromedial   proximal thigh, described above, concerning for malignancy. Soft tissue   sampling/histopathologic diagnosis is recommended for further evaluation.  Scattered T1 hypointense lesions within the proximal femur and visualized   portions of the pelvis, compatible with metastatic disease.    CT Abdomen and Pelvis w/ Oral Cont and w/ IV Cont (03.17.22 @ 09:50)   Diffuse edematous wall thickening of the ascending and right transverse   colon with surrounding fat stranding concerning for colitis.  Pancolonic diverticulosis and small bowel diverticulosis without acute   inflammation of the individual diverticula.  No bowel obstruction; persistent incomplete swirling of the mesentery   axis with widely patent SMA and SMV as described and unchanged when   compared to prior study 12/25/2020.  Increased attenuation of the urine in the bladder, correlate with   urinalysis for hematuria and/or infection.    TTE Echo Complete w/o Contrast w/ Doppler (03.17.22 @ 11:13)    Mild concentric left ventricular hypertrophy is present.   Estimated left ventricular ejection fraction is 55-60 %.   An interatrial septal aneurysm is noted.    CT Angio Lower Extremity w/ IV Cont, Right (03.15.22 @ 16:25)   Vascular mass involving predominantly the right vastus medialis muscle and encasing the right superficial femoral artery which is focally occluded. Recommend further evaluation with MRI.  Local skeletal blastic changes    LABS: All Labs Reviewed:                        7.6    17.36 )-----------( 249      ( 19 Mar 2022 06:17 )             25.5     136  |  106  |  10  ----------------------------<  104<H>  3.3<L>   |  23  |  0.52    acetaminophen     Tablet .. 650 milliGRAM(s) Oral every 6 hours PRN  aluminum hydroxide/magnesium hydroxide/simethicone Suspension 30 milliLiter(s) Oral every 4 hours PRN  ampicillin/sulbactam  IVPB      ampicillin/sulbactam  IVPB 3 Gram(s) IV Intermittent every 6 hours  calcium carbonate 1250 mG  + Vitamin D (OsCal 500 + D) 1 Tablet(s) Oral daily  enoxaparin Injectable 40 milliGRAM(s) SubCutaneous every 24 hours  enzalutamide 160 milliGRAM(s) Oral daily  lisinopril 5 milliGRAM(s) Oral daily  melatonin 3 milliGRAM(s) Oral at bedtime PRN  multivitamin 1 Tablet(s) Oral daily  ondansetron Injectable 4 milliGRAM(s) IV Push every 8 hours PRN  sodium chloride 0.9%. 1000 milliLiter(s) IV Continuous <Continuous>             CHIEF COMPLAINT: weakness  HPI: 76M with PMHx of Prostate cancer (diagnosed 10 years ago, follows with Dr. Nora Carlson at [facility] [place]) with mets to the bone, hx of RT; currently on chemotherapy presented to the ED complaining of a 3 month history of a progressively worsening weakness and fatigue associated shortness of breath and dizziness. Patient states he has become profoundly weak- to the point that he struggles with his activities of daily living. Of note, patient has lost approximately 12 pound in the past 3 months. States he was on a clinical trial that abruptly ended approximately 3 months ago and noticed symptoms shortly after. Also with intermittent chills, hot flashes, and diarrhea. Patient also with painless lump to right anterior thigh, which has been present for about 3 months. He does endorse early satiety/ loss of appetite.  In the ED patient with WBC elevated to 21, elevated D-dimer and CTA chest negative for PE.    3/19 - got a unit of blood and H&H minimally improved only; denies cp palps sob or lightheadedness     Vital Signs Last 24 Hrs  T(F): 99.6 (19 Mar 2022 15:23), Max: 99.6 (19 Mar 2022 15:23)  HR: 89 (19 Mar 2022 15:23) (51 - 89)  BP: 172/69 (19 Mar 2022 15:23) (138/53 - 172/69)  RR: 18 (19 Mar 2022 15:23) (17 - 18)  SpO2: 96% (19 Mar 2022 15:23) (94% - 96%)  Constitutional: NAD, awake and alert  HEENT: PERR, EOMI  Neck: Soft and supple, No JVD  Respiratory: Breath sounds are clear bilaterally, No wheezing, rales or rhonchi  Cardiovascular: S1 and S2, regular rate and rhythm, no Murmurs  Gastrointestinal: Bowel Sounds present, soft, nontender, nondistended  Extremities: No peripheral edema, painless lump right anterior thigh   Vascular: 2+ peripheral pulses  Neurological: A/O x 3, no focal deficits  Musculoskeletal: 5/5 strength b/l upper and lower extremities  Skin: No rashes    RADIOLOGY  MR Femur w/wo IV Cont, Right (03.16.22 @ 22:21)   Heterogeneously enhancing soft tissue mass within the anteromedial   proximal thigh, described above, concerning for malignancy. Soft tissue   sampling/histopathologic diagnosis is recommended for further evaluation.  Scattered T1 hypointense lesions within the proximal femur and visualized   portions of the pelvis, compatible with metastatic disease.    CT Abdomen and Pelvis w/ Oral Cont and w/ IV Cont (03.17.22 @ 09:50)   Diffuse edematous wall thickening of the ascending and right transverse   colon with surrounding fat stranding concerning for colitis.  Pancolonic diverticulosis and small bowel diverticulosis without acute   inflammation of the individual diverticula.  No bowel obstruction; persistent incomplete swirling of the mesentery   axis with widely patent SMA and SMV as described and unchanged when   compared to prior study 12/25/2020.  Increased attenuation of the urine in the bladder, correlate with   urinalysis for hematuria and/or infection.    TTE Echo Complete w/o Contrast w/ Doppler (03.17.22 @ 11:13)    Mild concentric left ventricular hypertrophy is present.   Estimated left ventricular ejection fraction is 55-60 %.   An interatrial septal aneurysm is noted.    CT Angio Lower Extremity w/ IV Cont, Right (03.15.22 @ 16:25)   Vascular mass involving predominantly the right vastus medialis muscle and encasing the right superficial femoral artery which is focally occluded. Recommend further evaluation with MRI.  Local skeletal blastic changes    LABS: All Labs Reviewed:                      7.6    17.36 )-----------( 249      ( 19 Mar 2022 06:17 )             25.5   136  |  106  |  10  ----------------------------<  104<H>  3.3<L>   |  23  |  0.52    MEDS:  acetaminophen     Tablet .. 650 milliGRAM(s) Oral every 6 hours PRN  aluminum hydroxide/magnesium hydroxide/simethicone Suspension 30 milliLiter(s) Oral every 4 hours PRN  ampicillin/sulbactam  IVPB      ampicillin/sulbactam  IVPB 3 Gram(s) IV Intermittent every 6 hours  calcium carbonate 1250 mG  + Vitamin D (OsCal 500 + D) 1 Tablet(s) Oral daily  enoxaparin Injectable 40 milliGRAM(s) SubCutaneous every 24 hours  enzalutamide 160 milliGRAM(s) Oral daily  lisinopril 5 milliGRAM(s) Oral daily  melatonin 3 milliGRAM(s) Oral at bedtime PRN  multivitamin 1 Tablet(s) Oral daily  ondansetron Injectable 4 milliGRAM(s) IV Push every 8 hours PRN  sodium chloride 0.9%. 1000 milliLiter(s) IV Continuous <Continuous>

## 2022-03-19 NOTE — PROGRESS NOTE ADULT - ASSESSMENT
75 y/o M diagnosed with [place] 4+5 (9) adenocarcinoma of the prostate metastatic to bone w/ recent clinical rial # with  and enzalutamide in 2019 and was on until 12/31/21 presents for symptomatic anemia and now with right groin mass and nonocclusive thrombus.    # anemia- slightly microcytic   - still anemic - now 7.6   - unclear if mass is sequalae of old hemmorhage  - FOBT negative  - s/p transfusion    # right groin mass  - - REviewed imaging with patient an spouse.   -- Reached out to primary oncology team   -- will plan for work up as an outpatient as IR at [facility] [place] will likely biopsy      # nonocclusive thrombus  - likely secondary to vascular obstruction / compromise from mass effect from proximal thigh mass  - would continue with lovenox now on prophylactic   - 3/17 US LE doppler - Nonocclusive thrombus in the right mid femoral vein at and below the level of the mass. No evidence of deep venous thrombosis in the left lower extremity.      # Leukocytosis - likely reactive neutrophil predominant    Saul Hernandes MD   Hematology/ Oncology   [facility]   [facility]   C: [phone]  [address] [place], [place]  P:[phone]  F:[phone]  and [phone]  [address] [place], [place]   P:[phone]  F:[phone]

## 2022-03-20 ENCOUNTER — TRANSCRIPTION ENCOUNTER (OUTPATIENT)
Age: 77
End: 2022-03-20

## 2022-03-20 VITALS
HEART RATE: 64 BPM | DIASTOLIC BLOOD PRESSURE: 54 MMHG | TEMPERATURE: 99 F | RESPIRATION RATE: 18 BRPM | OXYGEN SATURATION: 92 % | SYSTOLIC BLOOD PRESSURE: 157 MMHG

## 2022-03-20 LAB
ANION GAP SERPL CALC-SCNC: 7 MMOL/L — SIGNIFICANT CHANGE UP (ref 5–17)
BUN SERPL-MCNC: 10 MG/DL — SIGNIFICANT CHANGE UP (ref 7–23)
CALCIUM SERPL-MCNC: 8.9 MG/DL — SIGNIFICANT CHANGE UP (ref 8.5–10.1)
CHLORIDE SERPL-SCNC: 107 MMOL/L — SIGNIFICANT CHANGE UP (ref 96–108)
CO2 SERPL-SCNC: 24 MMOL/L — SIGNIFICANT CHANGE UP (ref 22–31)
CREAT SERPL-MCNC: 0.56 MG/DL — SIGNIFICANT CHANGE UP (ref 0.5–1.3)
EGFR: 103 ML/MIN/1.73M2 — SIGNIFICANT CHANGE UP
GLUCOSE SERPL-MCNC: 116 MG/DL — HIGH (ref 70–99)
HCT VFR BLD CALC: 27.6 % — LOW (ref 39–50)
HGB BLD-MCNC: 8.3 G/DL — LOW (ref 13–17)
MAGNESIUM SERPL-MCNC: 2.1 MG/DL — SIGNIFICANT CHANGE UP (ref 1.6–2.6)
MCHC RBC-ENTMCNC: 24.3 PG — LOW (ref 27–34)
MCHC RBC-ENTMCNC: 30.1 GM/DL — LOW (ref 32–36)
MCV RBC AUTO: 80.7 FL — SIGNIFICANT CHANGE UP (ref 80–100)
PLATELET # BLD AUTO: 276 K/UL — SIGNIFICANT CHANGE UP (ref 150–400)
POTASSIUM SERPL-MCNC: 4 MMOL/L — SIGNIFICANT CHANGE UP (ref 3.5–5.3)
POTASSIUM SERPL-SCNC: 4 MMOL/L — SIGNIFICANT CHANGE UP (ref 3.5–5.3)
RBC # BLD: 3.42 M/UL — LOW (ref 4.2–5.8)
RBC # FLD: 18.6 % — HIGH (ref 10.3–14.5)
SODIUM SERPL-SCNC: 138 MMOL/L — SIGNIFICANT CHANGE UP (ref 135–145)
WBC # BLD: 18.56 K/UL — HIGH (ref 3.8–10.5)
WBC # FLD AUTO: 18.56 K/UL — HIGH (ref 3.8–10.5)

## 2022-03-20 PROCEDURE — 99239 HOSP IP/OBS DSCHRG MGMT >30: CPT

## 2022-03-20 RX ORDER — ENOXAPARIN SODIUM 100 MG/ML
40 INJECTION SUBCUTANEOUS
Qty: 10 | Refills: 0
Start: 2022-03-20 | End: 2022-03-29

## 2022-03-20 RX ADMIN — ENZALUTAMIDE 160 MILLIGRAM(S): 80 TABLET ORAL at 09:28

## 2022-03-20 RX ADMIN — AMPICILLIN SODIUM AND SULBACTAM SODIUM 200 GRAM(S): 250; 125 INJECTION, POWDER, FOR SUSPENSION INTRAMUSCULAR; INTRAVENOUS at 13:07

## 2022-03-20 RX ADMIN — ENOXAPARIN SODIUM 40 MILLIGRAM(S): 100 INJECTION SUBCUTANEOUS at 11:24

## 2022-03-20 RX ADMIN — Medication 1 TABLET(S): at 09:26

## 2022-03-20 RX ADMIN — AMPICILLIN SODIUM AND SULBACTAM SODIUM 200 GRAM(S): 250; 125 INJECTION, POWDER, FOR SUSPENSION INTRAMUSCULAR; INTRAVENOUS at 00:23

## 2022-03-20 RX ADMIN — AMPICILLIN SODIUM AND SULBACTAM SODIUM 200 GRAM(S): 250; 125 INJECTION, POWDER, FOR SUSPENSION INTRAMUSCULAR; INTRAVENOUS at 06:05

## 2022-03-20 RX ADMIN — LISINOPRIL 5 MILLIGRAM(S): 2.5 TABLET ORAL at 09:26

## 2022-03-20 NOTE — DISCHARGE NOTE PROVIDER - NSDCCPCAREPLAN_GEN_ALL_CORE_FT
PRINCIPAL DISCHARGE DIAGNOSIS  Diagnosis: Prostate cancer  Assessment and Plan of Treatment: with associated anemia - s/p blood transfusions  leg mass with thrombus - continue lovenox as prescribed and arrange for biopsy with [facility] team      SECONDARY DISCHARGE DIAGNOSES  Diagnosis: Acute colitis  Assessment and Plan of Treatment: augmentin as prescribed

## 2022-03-20 NOTE — PROGRESS NOTE ADULT - SUBJECTIVE AND OBJECTIVE BOX
INTERVAL HPI/OVERNIGHT EVENTS:  Patient S&E at bedside. No o/n events, patient resting comfortably.   no pain with groin lesion     VITAL SIGNS:  T(F): 98.8 (03-20-22 @ 08:36)  HR: 59 (03-20-22 @ 08:36)  BP: 144/53 (03-20-22 @ 08:36)  RR: 17 (03-20-22 @ 08:36)  SpO2: 95% (03-20-22 @ 08:36)  Wt(kg): --    PHYSICAL EXAM:    Constitutional: NAD  Eyes: EOMI, sclera non-icteric  Neck: supple, no masses, no JVD  Respiratory: CTA b/l, good air entry b/l  Cardiovascular: RRR, no M/R/G  Gastrointestinal: soft, NTND, no masses palpable, + BS, no hepatosplenomegaly  Extremities: RIGHT proximal groin mass  Neurological: AAOx3      MEDICATIONS  (STANDING):  ampicillin/sulbactam  IVPB      ampicillin/sulbactam  IVPB 3 Gram(s) IV Intermittent every 6 hours  calcium carbonate 1250 mG  + Vitamin D (OsCal 500 + D) 1 Tablet(s) Oral daily  enoxaparin Injectable 40 milliGRAM(s) SubCutaneous every 24 hours  enzalutamide 160 milliGRAM(s) Oral daily  lisinopril 5 milliGRAM(s) Oral daily  multivitamin 1 Tablet(s) Oral daily    MEDICATIONS  (PRN):  acetaminophen     Tablet .. 650 milliGRAM(s) Oral every 6 hours PRN Temp greater or equal to 38C (100.4F), Mild Pain (1 - 3)  aluminum hydroxide/magnesium hydroxide/simethicone Suspension 30 milliLiter(s) Oral every 4 hours PRN Dyspepsia  melatonin 3 milliGRAM(s) Oral at bedtime PRN Insomnia  ondansetron Injectable 4 milliGRAM(s) IV Push every 8 hours PRN Nausea and/or Vomiting      Allergies    No Known Allergies    Intolerances        LABS:                        8.3    18.56 )-----------( 276      ( 20 Mar 2022 06:28 )             27.6     03-20    138  |  107  |  10  ----------------------------<  116<H>  4.0   |  24  |  0.56    Ca    8.9      20 Mar 2022 06:28  Phos  2.6     03-19  Mg     2.1     03-20            RADIOLOGY & ADDITIONAL TESTS:  Studies reviewed.    ASSESSMENT & PLAN:

## 2022-03-20 NOTE — PROGRESS NOTE ADULT - REASON FOR ADMISSION
Symptomatic Anemia

## 2022-03-20 NOTE — DISCHARGE NOTE PROVIDER - HOSPITAL COURSE
HPI: 76M with PMHx of Prostate cancer (diagnosed 10 years ago, follows with Dr. Nora Carlson at [facility]) with mets to the bone, hx of RT; currently on chemotherapy presented to the ED complaining of a 3 month history of a progressively worsening weakness and fatigue associated shortness of breath and dizziness. Patient states he has become profoundly weak- to the point that he struggles with his activities of daily living. Of note, patient has lost approximately 12 pound in the past 3 months. States he was on a clinical trial that abruptly ended approximately 3 months ago and noticed symptoms shortly after. Also with intermittent chills, hot flashes, and diarrhea. Patient also with painless lump to right anterior thigh, which has been present for about 3 months. He does endorse early satiety/ loss of appetite.  HOSPITAL COURSE:   Symptomatic microcytic anemia due to chemotherapy, doubt GI bleed  - FOBT NEGATIVE  SOB, chest pain due to severe anemia with abnormal EKG ruled out ACS and PE   Metastatic Prostate Cancer  RT thigh mass - vascular vs other   - pt s/p multiple PRBCs   - Echo- as above LVEF 55-60%, no RWMA   - CTA chest negative for PE  - doppler LE to r/o DVT- Nonocclusive thrombus in the right mid femoral vein at and below the level of the mass   - to go home on prophylactic dose lovenox (as opposed to rx dose) for now as he may need a biopsy in the next few days and his H&H is on the low side    - On enzalutamide  Right thigh soft tissue mass   - MRI demonstrates Soft tissue mass, Scattered T1 hypointense lesions within the proximal femur and visualized portions of the pelvis, compatible with metastatic disease -vascular mass involving predominantly the right vastus medialis muscle and encasing the right superficial femoral artery which is focally occluded.  - currently without pain/limb weakness/ischemic changes   - Cont Lovenox; hold off on  Full dose AC at this time in case this mass bleeds - plan for IR biopsy soon as OP with [facility] team   seen by vascular and ortho as well   Leukocytosis likely 2/2 acute colitis - change to augmentin on dc   Severe Protein Calorie Malnutrition  - seen by registered dietician  Right lower lobe lung nodule  - Pt aware of nodule; States it has been present for 10 years  - Stopped smoking 6 months ago  HTN - Continue Lisinopril  Code status: Full code    OTHER DETAILS:  3/20 - denies cp palps sob or lightheadedness   Vital Signs Last 24 Hrs  T(F): 98.8 (20 Mar 2022 08:36), Max: 99.8 (19 Mar 2022 21:09)  HR: 59 (20 Mar 2022 08:36) (59 - 97)  BP: 144/53 (20 Mar 2022 08:36) (144/53 - 172/69)  RR: 17 (20 Mar 2022 08:36) (17 - 18)  SpO2: 95% (20 Mar 2022 08:36) (93% - 96%)  Constitutional: NAD, awake and alert  HEENT: PERR, EOMI  Neck: Soft and supple, No JVD  Respiratory: Breath sounds are clear bilaterally, No wheezing, rales or rhonchi  Cardiovascular: S1 and S2, regular rate and rhythm, no Murmurs  Gastrointestinal: Bowel Sounds present, soft, nontender, nondistended  Extremities: No peripheral edema, painless lump right anterior thigh   Vascular: 2+ peripheral pulses  Neurological: A/O x 3, no focal deficits  Musculoskeletal: 5/5 strength b/l upper and lower extremities  Skin: No rashes    RADIOLOGY  MR Femur w/wo IV Cont, Right (03.16.22 @ 22:21)   Heterogeneously enhancing soft tissue mass within the anteromedial   proximal thigh, described above, concerning for malignancy. Soft tissue   sampling/histopathologic diagnosis is recommended for further evaluation.  Scattered T1 hypointense lesions within the proximal femur and visualized   portions of the pelvis, compatible with metastatic disease.  CT Abdomen and Pelvis w/ Oral Cont and w/ IV Cont (03.17.22 @ 09:50)   Diffuse edematous wall thickening of the ascending and right transverse   colon with surrounding fat stranding concerning for colitis.  Pancolonic diverticulosis and small bowel diverticulosis without acute   TTE Echo Complete w/o Contrast w/ Doppler (03.17.22 @ 11:13)    Mild concentric left ventricular hypertrophy is present.   Estimated left ventricular ejection fraction is 55-60 %.   An interatrial septal aneurysm is noted.  CT Angio Lower Extremity w/ IV Cont, Right (03.15.22 @ 16:25)   Vascular mass involving predominantly the right vastus medialis muscle and encasing the right superficial femoral artery which is focally occluded. Recommend further evaluation with MRI.    LABS: All Labs Reviewed:                     8.3    18.56 )-----------( 276      ( 20 Mar 2022 06:28 )             27.6   138  |  107  |  10  ----------------------------<  116<H>  4.0   |  24  |  0.56  Ca    8.9      20 Mar 2022 06:28  Phos  2.6     03-19  Mg     2.1     03-20    time spent on discharge - 55 mins   discussed with DR Hernandes/Onc

## 2022-03-20 NOTE — PROGRESS NOTE ADULT - ASSESSMENT
77 y/o M diagnosed with [place] 4+5 (9) adenocarcinoma of the prostate metastatic to bone w/ recent clinical rial # with  and enzalutamide in 2019 and was on until 12/31/21 presents for symptomatic anemia and now with right groin mass and nonocclusive thrombus.    # anemia- slightly microcytic   - hb stable now 8.3  - unclear if mass is sequalae of old hemmorhage  - FOBT negative  - s/p transfusion    # right groin mass  - - REviewed imaging with patient an spouse.   -- Reached out to primary oncology team   -- will plan for work up as an outpatient as IR at [facility] will likely biopsy      # nonocclusive thrombus  - likely secondary to vascular obstruction / compromise from mass effect from proximal thigh mass  - would continue with lovenox now on prophylactic   - 3/17 US LE doppler - Nonocclusive thrombus in the right mid femoral vein at and below the level of the mass. No evidence of deep venous thrombosis in the left lower extremity.  - PLAN FOR D?C today     # Leukocytosis - likely reactive neutrophil predominant    Saul Hernandes MD   Hematology/ Oncology   [facility]   [facility]   C: [phone]  [address] [place], [place]  P:[phone]  F:[phone]  and [phone]  [address] [place], [place]   P:[phone]  F:[phone]

## 2022-03-20 NOTE — PROGRESS NOTE ADULT - SUBJECTIVE AND OBJECTIVE BOX
Patient seen and examined at bedside this morning. Denies pain, weakness, numbness, or tingling in the RLE. Continues to deny impaired ambulation. Denies any perceived changes in R thigh mass. Endorses transient episode of "shallow breathing" overnight now resolved with no recurrence, and denies any other accompanying symptoms at that time. Denies fevers, chills, headaches chest pain, nausea, vomiting, or other orthopaedic complaints at time of current encounter.       VITALS  T(C): 36.6 (20 Mar 2022 03:00), Max: 37.7 (19 Mar 2022 21:09)  T(F): 97.8 (20 Mar 2022 03:00), Max: 99.8 (19 Mar 2022 21:09)  HR: 97 (19 Mar 2022 21:09) (58 - 97)  BP: 160/80 (19 Mar 2022 21:09) (157/56 - 172/69)  RR: 18 (19 Mar 2022 21:09) (17 - 18)  SpO2: 93% (19 Mar 2022 21:09) (93% - 96%)          LABS:                        8.3    18.56 )-----------( 276      ( 20 Mar 2022 06:28 )             27.6     03-20    138  |  107  |  10  ----------------------------<  116<H>  4.0   |  24  |  0.56    Ca    8.9      20 Mar 2022 06:28  Phos  2.6     03-19  Mg     2.1     03-20          Gen: NAD, Resting comfortably    RLE:  Skin intact; Palpable, non-tender, non-mobile mass in the anteromedial thigh.  No bony tenderness to palpation.   +Q/H/TA/EHL/FHL/GSC.   +SILT L3-S1.   + DP.   Compartments soft and compressible.   No calf tenderness.       A/P:  Patient is a 76y Male w/ RLE mass identified on MRI/CT LLE.     - Medical management appreciated.   - Due to the nature of the lesions proximity to the femoral artery, further management per Vascular Surgery Team. Agree with IR biopsy.  - Orthopaedic Oncology (Dr. Parminder Henry): Agrees with plan for biospy (See Chart Note - 3/19).  - NM Bone Scan.  - No acute orthopaedic surgical intervention indicated at this time.  - FU XR R Pelvis/Hip/TibFib.   - Multimodal analgesia - Recommend low dose opioids and acetaminophen as tolerated as needed.   - WBAT RLE  - DVT Ppx: Per Primary Team.   - PT/OT.   - Ice and elevate as tolerated.    - Discussed with Dr. Low who is aware of and in agreement with the above plan.

## 2022-03-20 NOTE — DISCHARGE NOTE NURSING/CASE MANAGEMENT/SOCIAL WORK - PATIENT PORTAL LINK FT
You can access the FollowMyHealth Patient Portal offered by [facility] by registering at the following website: [email or web address]. By joining [facility]’s FollowMyHealth portal, you will also be able to view your health information using other applications (apps) compatible with our system.

## 2022-03-20 NOTE — DISCHARGE NOTE PROVIDER - NSDCQMPCI_CARD_ALL_CORE
This is a recent snapshot of the patient's [facility] medical record.  For current drug dose and complete information and questions, call [phone] or In Basket pool, [facility] home infusion ([id number])  CSN Number:  [id number]       No

## 2022-03-20 NOTE — PROGRESS NOTE ADULT - ATTENDING COMMENTS
Appreciate ACP help.  Pt seen and examined with KODI Griffith; all labs and imaging reviewed together. POC discussed.  The above documentation reviewed by me and contains my recommendations.
No ortho intervention at this time. Will continue observation.
Ortho orthopedic evaluation, Dr Henry, is appreciated. Will continue observation.
Patient seen and examined  on rounds with NP Pauline Griffith .  I was physically present for the key portion of the evaluation and management service provided, I  examined the patient myself and reviewed the plan of care with the patient and  NP Pauline Griffith , which I have reviewed and edited .  Medical records reviewed. History, review of systems, physical findings and lab results as documented confirmed , except as modified by me.  Agree with the assessment and plan of as stated and discussed, except as modified below.       76M with PMHx of Prostate cancer ( diagnosed 10 years ago, follows with Dr. Nora Carlson at [facility]) with mets to the bone, hx of RT.,currently on chemotherapy presented to the ED complaining of a 3 month history of a progressively worsening weakness and fatigue associated shortness of breath and dizziness.     1. Symptomatic microcytic anemia  - transfuse 2 units, neg  FOBT, iron studies , oncology evaluation, CT abd - to r/o acute pathology  2. SOB, chest pain due to severe anemia  with abnormal EKG ruled out ACS and  PE  - repeat EKG in am, tropornin x2 neg , 2 d echo   3. Metastatic Prostate Cancer - as per oncology  4. Right leg mass -doppler LE to r/o DVT, MRI - to evaluate the mass  5. Elevated AP - likely due to bone metastasis   6. Leukocytosis - no signs of UTI, PNA, Ct abd - pending   7. RLL nodule - o/p monitoring as per oncology     Dispo - transfusion, MRI of the femur, doppler of LE, 2 d echo, repeat EKG
Appreciate ACP help.  Pt seen and examined with KODI Griffith; all labs and imaging reviewed together. POC discussed.  The above documentation reviewed by me and contains my recommendations.

## 2022-03-20 NOTE — DISCHARGE NOTE NURSING/CASE MANAGEMENT/SOCIAL WORK - NSDCPEFALRISK_GEN_ALL_CORE
For information on Fall & Injury Prevention, visit: [email or web address] OR  [email or web address] OR  https://www.cdc.gov/steadi/patient.html

## 2022-03-20 NOTE — DISCHARGE NOTE PROVIDER - NSDCMRMEDTOKEN_GEN_ALL_CORE_FT
amoxicillin-clavulanate 875 mg-125 mg oral tablet: 1 tab(s) orally every 12 hours   Caltrate 600 + D oral tablet: 1 tab(s) orally once a day  enoxaparin 40 mg/0.4 mL injectable solution: 40 milligram(s) subcutaneously once a day   lisinopril 5 mg oral tablet: 1 tab(s) orally once a day  Multiple Vitamins oral tablet: 1 tab(s) orally once a day  Tylenol 500 mg oral tablet: 1 tab(s) orally every 4 times a day for arthritis pain  Xtandi 40 mg oral capsule: 4 cap(s) orally once a day  ***pt wife can bring in from home if needed***

## 2022-03-20 NOTE — DISCHARGE NOTE PROVIDER - CARE PROVIDER_API CALL
Nora Joseph  Internal Medicine  [address] [facility]  [place], [place] [id number]  Phone: [phone]  Fax: ()-  Follow Up Time: 1-3 days

## 2022-03-20 NOTE — PROGRESS NOTE ADULT - PROVIDER SPECIALTY LIST ADULT
Hospitalist
Heme/Onc
Hospitalist
Orthopedics
Heme/Onc
Orthopedics
Heme/Onc
Heme/Onc
Hospitalist
Hospitalist

## 2022-03-21 LAB
CULTURE RESULTS: SIGNIFICANT CHANGE UP
CULTURE RESULTS: SIGNIFICANT CHANGE UP
SPECIMEN SOURCE: SIGNIFICANT CHANGE UP
SPECIMEN SOURCE: SIGNIFICANT CHANGE UP
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
## 2022-03-26 DIAGNOSIS — R94.31 ABNORMAL ELECTROCARDIOGRAM [ECG] [EKG]: ICD-10-CM

## 2022-03-26 DIAGNOSIS — I10 ESSENTIAL (PRIMARY) HYPERTENSION: ICD-10-CM
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
## 2022-03-26 DIAGNOSIS — K52.9 NONINFECTIVE GASTROENTERITIS AND COLITIS, UNSPECIFIED: ICD-10-CM

## 2022-03-26 DIAGNOSIS — Z92.3 PERSONAL HISTORY OF IRRADIATION: ICD-10-CM

## 2022-03-26 DIAGNOSIS — C61 MALIGNANT NEOPLASM OF PROSTATE: ICD-10-CM

## 2022-03-26 DIAGNOSIS — T45.1X5A ADVERSE EFFECT OF ANTINEOPLASTIC AND IMMUNOSUPPRESSIVE DRUGS, INITIAL ENCOUNTER: ICD-10-CM

## 2022-03-26 DIAGNOSIS — D64.81 ANEMIA DUE TO ANTINEOPLASTIC CHEMOTHERAPY: ICD-10-CM

## 2022-03-26 DIAGNOSIS — C79.51 SECONDARY MALIGNANT NEOPLASM OF BONE: ICD-10-CM

## 2022-03-26 DIAGNOSIS — E43 UNSPECIFIED SEVERE PROTEIN-CALORIE MALNUTRITION: ICD-10-CM

## 2022-03-26 DIAGNOSIS — Z79.899 OTHER LONG TERM (CURRENT) DRUG THERAPY: ICD-10-CM

## 2022-03-26 DIAGNOSIS — Z20.822 CONTACT WITH AND (SUSPECTED) EXPOSURE TO COVID-19: ICD-10-CM

## 2022-03-26 DIAGNOSIS — I82.411 ACUTE EMBOLISM AND THROMBOSIS OF RIGHT FEMORAL VEIN: ICD-10-CM

## 2022-03-26 DIAGNOSIS — R91.1 SOLITARY PULMONARY NODULE: ICD-10-CM

## 2022-03-26 DIAGNOSIS — I77.9 DISORDER OF ARTERIES AND ARTERIOLES, UNSPECIFIED: ICD-10-CM

## 2022-04-16 NOTE — PATIENT PROFILE ADULT - DIETITIAN.
[de-identified] : Bug bite on the left eye last night and coughing [FreeTextEntry6] : c/o bug bite on upper left eyelid x 1 day\par recent flu , continues with cough and congestion, no fever, eating well  dietitian/nutrition services

## 2022-06-10 NOTE — DISCHARGE NOTE NURSING/CASE MANAGEMENT/SOCIAL WORK - NSDCPELOVENOXCOMP_GEN_ALL_CORE
PATIENT INSTRUCTIONS    Treatment:    Physical Therapy      [address]    [address] [facility], [facility]  [address] [address] [facility],[address], [address] [facility] [address] [facility],  [address] [address] [address], [facility], [address] [address] [facility] [address] [facility], [address] [facility] [phone]      Â· Occupational Therapy (Hand/Upper extremity therapy) is offered at the [facility], [facility] ([facility]), and [address] locations. Â· Please dress according to treatment area (ex: knee treatment needs to wear shorts)  Â· Co-payments are due at the time of appointment  Â· To ensure your visit goes as smooth as possible, please check your insurance benefits for coverage of physical therapy and bring a copy of your insurance card. Â· If physical therapy does not call you within 3 days please call them at the numbers listed above. ICE :  Ice:  apply ice for 20 minutes 3 times a day. No barrier between the ice and skin is needed when using cubes or frozen peas. If you are using a chemical ice pack please place a barrier between the ice pack and your skin    Mobic: take 1 tablet 1 time a day for 10 days. After 10 days, take as needed as directed. Do not take any other anti-inflammatory medication while taking this medication. Extra Strength Tylenol (500 mg tablets): Take 2 tablet(s) up to 3 times a day as needed for pain.  Do not exceed 3000 mg per day (of all Tylenol products)    (If taking regular Tylenol- Take 1-2 tablets every 4-6 hours as needed for pain, not exceeding 3000 mg)        Heel Wedges: - wear with the thicker portion in the outside of your shoes  â¢ You may purchase the heel wedges from amazon.com.    â¢ You may gradually wean yourself into them over the next 1-2 weeks after you receive them       Follow-Up:  Please make an appointment with  Dr. Ric Hermosillo in 4 weeks. Please note: 24 hour notice for cancellation of appointment is required. You may receive a survey in the mail, or via the e-mail address that you have provided. We would appreciate if you could fill out the survey and provide us with any feedback on your experience regarding your visit today. Thank you for allowing us to provide you with your health care needs. Do not hesitate to call if you are experiencing severe pain, worsening or change in your pain, have symptoms of infection (fever, warmth, redness, increased drainage), or have any other problem that concerns you ~ [phone] (or [phone] after hours). Please remember when requesting refills on pain medication that the request should be made by Thursday at the [address] is open Monday-Friday, 8am-5pm, and closed on the weekends. No narcotic refills will be filled after hours. Additional Educational Resources: For additional resources regarding your symptoms, diagnosis, or further health information, please visit the Health Resources section on [employer] or the Online Health Resources section in [employer]. Enoxaparin/Lovenox is used to treat and prevent blood clots. Use a different body area each time you give yourself a shot. Keep track of where you give each shot to make sure you rotate body areas. Use a new needle and syringe each time you inject your medicine. Never skip a dose of Enoxaparin/Lovenox. You must use this medicine on a fixed schedule.  NEVER TAKE A DOUBLE DOSE. Call your doctor or pharmacist if you miss a dose. Take Enoxaparin/Lovenox at the same time each morning and/or evening.

## 2022-08-15 ENCOUNTER — APPOINTMENT (OUTPATIENT)
Dept: VASCULAR SURGERY | Facility: CLINIC | Age: 77
End: 2022-08-15
